# Patient Record
Sex: FEMALE | Race: WHITE | Employment: OTHER | URBAN - METROPOLITAN AREA
[De-identification: names, ages, dates, MRNs, and addresses within clinical notes are randomized per-mention and may not be internally consistent; named-entity substitution may affect disease eponyms.]

---

## 2023-02-21 LAB — AMMONIA (UMOL/L) IN PLASMA: 49 UMOL/L

## 2023-03-14 ENCOUNTER — NURSING HOME VISIT (OUTPATIENT)
Dept: POST ACUTE CARE | Facility: EXTERNAL LOCATION | Age: 68
End: 2023-03-14
Payer: MEDICARE

## 2023-03-14 DIAGNOSIS — E03.9 HYPOTHYROIDISM, UNSPECIFIED TYPE: ICD-10-CM

## 2023-03-14 DIAGNOSIS — F29 ATYPICAL PSYCHOSIS (MULTI): Primary | ICD-10-CM

## 2023-03-14 DIAGNOSIS — E46 PROTEIN-CALORIE MALNUTRITION, UNSPECIFIED SEVERITY (MULTI): ICD-10-CM

## 2023-03-14 DIAGNOSIS — F31.9 BIPOLAR AFFECTIVE DISORDER, REMISSION STATUS UNSPECIFIED (MULTI): ICD-10-CM

## 2023-03-14 DIAGNOSIS — F25.9 SCHIZOAFFECTIVE DISORDER, UNSPECIFIED TYPE (MULTI): ICD-10-CM

## 2023-03-14 PROCEDURE — 99308 SBSQ NF CARE LOW MDM 20: CPT | Performed by: EMERGENCY MEDICINE

## 2023-03-14 NOTE — LETTER
Patient: Daysi John  : 1955    Encounter Date: 2023    Daysi oJhn   67 y.o.  female  @location@        Not on File    Assessment and Plan:    Source of history: Nurse, Medical personnel, Medical record, Patient.  History limitation: None.    HPI:    No current outpatient medications on file.     No current facility-administered medications for this visit.             Physical Exam:  Vital signs as per nursing/MA documentation  General appearance: Alert and in no acute distress  HEENT: Normal Inspection  Neck - Normal Inspection  Respiratory : No respiratory distress. Lungs are clear   Cardiovascular: heart rate normal. No gallop  Back - normal inspection  Skin inspection:Warm  Musculoskeletal : No deformities  Neuro : Limited exam. Baseline    ROS: Review of symptoms is negative except for what is mentioned in HPI      No family history on file.    Social History     Socioeconomic History   • Marital status:      Spouse name: Not on file   • Number of children: Not on file   • Years of education: Not on file   • Highest education level: Not on file   Occupational History   • Not on file   Tobacco Use   • Smoking status: Not on file   • Smokeless tobacco: Not on file   Vaping Use   • Vaping status: Not on file   Substance and Sexual Activity   • Alcohol use: Not on file   • Drug use: Not on file   • Sexual activity: Not on file   Other Topics Concern   • Not on file   Social History Narrative   • Not on file     Social Determinants of Health     Financial Resource Strain: Not on file   Food Insecurity: Not on file   Transportation Needs: Not on file   Physical Activity: Not on file   Stress: Not on file   Social Connections: Not on file   Intimate Partner Violence: Not on file   Housing Stability: Not on file       Past Medical History:   Diagnosis Date   • Major depressive disorder, recurrent, unspecified (CMS/HCC)     Recurrent major depressive disorder, remission status unspecified   •  Personal history of other endocrine, nutritional and metabolic disease     History of vitamin D deficiency   • Personal history of other endocrine, nutritional and metabolic disease 02/26/2020    History of hypothyroidism   • Personal history of other mental and behavioral disorders     History of schizoaffective disorder   • Personal history of other mental and behavioral disorders     History of anxiety   • Polyp of vagina     Polyp of vagina   • Schizoaffective disorder, bipolar type (CMS/HCC)     Schizoaffective disorder, bipolar type without good prognostic features       No past surgical history on file.      Charting was completed using voice recognition technology and may include unintended errors.    Discussed with patient/family, RN, and NP.     Provider Impression     67/F     Zamzameim     1. Abdominal pain -chronic  2. Chronic constipation. Continue with a bowel regimen. The patient does have bowel movements every day  3.Severe anxiety and depression managed by psych consultants. Patient is on number of psychotropic agents. Please see HPI for medication list  4. 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 Contributing to her constipation issues  5. Nutritional status is adequate with no concerns. Patient remains obese.  6. Progressive supranuclear ophthalmoplegia. Patient on valproic acid.  7. Skin remains intact without breakdown or decubiti  8. Hypothyroidism currently supplemented     This is a woman who has multiple medical problems including rather severe mental illness. This has resulted in significant self-care deficits. She requires assistance in all activities of daily living. We will work-up this abdominal pain and her complaints. Blood work and ultrasound imaging has been ordered. We will make further recommendations following review of these results.     Provide safe environment for the patient     Continue current medication regimen     OT PT and speech therapy     Bowel and bladder,skin  care     Nutritional support      monitor and treat blood glucose     GI and DVT prophylaxis     PRN medications     Periodic lab work     Regular Follow up      CODE STATUS needs to be addressed and/or verified     Charting was completed using electronic voice recognition technology and may have unintended errors which may not have been completely corrected.        History of Present Illness  ALT-unit #6     Progress note  DNR CCA        This is a 67-year-old long-term resident.      chronic abd pain     Patient suffers from schizoaffective disorder, major depression, bipolar disorder, anxiety disorder, paranoia with psychosis. She has had ECT in the past.        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, generalized weakness, impaired mobility, thrombocytopenia, vitamin D deficiency, hypothyroidism     MEDICATION  Facility protocol meds as needed, vitamin B12 1000 mcg daily, nystatin ointment, omeprazole 20 mg daily, vitamin D3 1000 units daily, clonazepam 0.5 mg 3 tablets twice daily, Synthroid 50 mcg daily, Zofran 4 mg every 6 hours as needed, trazodone 100 mg at bedtime, Sinemet  mg 3 times daily, Biotene dry mouth moisturizer, Lipitor 20 mg daily, folic acid 1 mg daily, Effexor  mg daily, valproic acid 250 mg twice daily, ax, artificial tears, Pyridium 200 mg every 12 hours as needed, Estrace 0.1 mg/g insert 0.1 g vaginally at bedtime for 14 days, Abilify 15 mg daily, Celexa 10 mg daily     SOCIAL/FAMILY HISTORY  Reviewed as previously documented      Review of Systems  All system review as allowed by patient condition and nursing input is negative        Active Problems  Problems    · Dementia with parkinsonism (331.82,294.10) (G20,F02.80)   · Depression with anxiety (300.4)  (F41.8)   · Family history of breast cancer (V16.3) (Z80.3)   · Fibrocystic breast (610.1) (N60.19)   · Frontal lobe deficit (799.55) (R41.844)   · Memory loss (780.93) (R41.3)   · Neurocognitive deficits (781.99) (R29.818,R41.89)   · Parkinsonism (332.0) (G20)   · Progressive supranuclear palsy (333.0) (G23.1)   · Schizoaffective disorder, mixed type (295.70) (F25.0)     Past Medical History  Problems    · History of anxiety (V11.8) (Z86.59)   · History of hypothyroidism (V12.29) (Z86.39)   · History of schizoaffective disorder (V11.0) (Z86.59)   · History of vitamin D deficiency (V12.1) (Z86.39)   · History of Polyp of vagina (623.7) (N84.2)   · History of Recurrent major depressive disorder, remission status unspecified (296.30)  (F33.9)   · History of Schizoaffective disorder, bipolar type without good prognostic features (295.70)  (F25.0)     Family History  Mother    · Family history of depression (V17.0) (Z81.8)  Father    · Family history of depression (V17.0) (Z81.8)  Sister    · Family history of breast cancer (V16.3) (Z80.3)  Brother    · Family history of mental disorder (V17.0) (Z81.8)  Maternal Aunt    · Family history of breast cancer (V16.3) (Z80.3)     Social History  Problems    · Born in [place]   · Completed college, bachelors degree   · General Sciences.   · Completed elementary school   · Completed graduate school, masters degree   · Criminal Justice Degree   · Completed high school   · Former smoker (V15.82) (Z87.891)   · Has no children   · Retired from employment   · Was a [employer]  from 1992 to 2009. Before that was [employer]      .   ·  (V61.07) (Z63.4)   ·  from 1989 to 2017 when her  passed away.     Allergies  Medication    · Penicillins   Recorded By: Jessica Felix; 1/16/2014 11:48:33 AM   · Tetracyclines   Recorded By: Jessica Felix; 1/16/2014 11:48:33 AM     Vitals  /78, T 97.9, HR 74, .4, RR 18, O2 sat 97%       Physical Exam     Vital signs as per nursing/MA documentation  General appearance: Alert and in no acute distress  HEENT: Normal Inspection  Neck - Normal Inspectiopn  Respiratory : No respiratory distress. Lungs are clear   Cardiovascular: heart rate normal. No gallop  Back - normal inspection  Skin inspection:Warm  Musculoskeletal : No deformities  Neuro : Limited exam. Baseline  Psychiatric : Cooperative         Electronically Signed By: Tereso Acharya MD   4/6/23  5:57 PM

## 2023-04-06 PROBLEM — E46 PROTEIN-CALORIE MALNUTRITION (MULTI): Status: ACTIVE | Noted: 2023-04-06

## 2023-04-06 PROBLEM — F31.9 BIPOLAR DISORDER (MULTI): Status: ACTIVE | Noted: 2023-04-06

## 2023-04-06 PROBLEM — F25.9 SCHIZOAFFECTIVE DISORDER (MULTI): Status: ACTIVE | Noted: 2023-04-06

## 2023-04-06 PROBLEM — E03.9 HYPOTHYROIDISM: Status: ACTIVE | Noted: 2023-04-06

## 2023-04-06 PROBLEM — F29 ATYPICAL PSYCHOSIS (MULTI): Status: ACTIVE | Noted: 2023-04-06

## 2023-04-06 NOTE — PROGRESS NOTES
Daysi John   67 y.o.  female  @location@        Not on File    Assessment and Plan:    Source of history: Nurse, Medical personnel, Medical record, Patient.  History limitation: None.    HPI:    No current outpatient medications on file.     No current facility-administered medications for this visit.             Physical Exam:  Vital signs as per nursing/MA documentation  General appearance: Alert and in no acute distress  HEENT: Normal Inspection  Neck - Normal Inspection  Respiratory : No respiratory distress. Lungs are clear   Cardiovascular: heart rate normal. No gallop  Back - normal inspection  Skin inspection:Warm  Musculoskeletal : No deformities  Neuro : Limited exam. Baseline    ROS: Review of symptoms is negative except for what is mentioned in HPI      No family history on file.    Social History     Socioeconomic History    Marital status:      Spouse name: Not on file    Number of children: Not on file    Years of education: Not on file    Highest education level: Not on file   Occupational History    Not on file   Tobacco Use    Smoking status: Not on file    Smokeless tobacco: Not on file   Vaping Use    Vaping status: Not on file   Substance and Sexual Activity    Alcohol use: Not on file    Drug use: Not on file    Sexual activity: Not on file   Other Topics Concern    Not on file   Social History Narrative    Not on file     Social Determinants of Health     Financial Resource Strain: Not on file   Food Insecurity: Not on file   Transportation Needs: Not on file   Physical Activity: Not on file   Stress: Not on file   Social Connections: Not on file   Intimate Partner Violence: Not on file   Housing Stability: Not on file       Past Medical History:   Diagnosis Date    Major depressive disorder, recurrent, unspecified (CMS/HCC)     Recurrent major depressive disorder, remission status unspecified    Personal history of other endocrine, nutritional and metabolic disease     History of  vitamin D deficiency    Personal history of other endocrine, nutritional and metabolic disease 02/26/2020    History of hypothyroidism    Personal history of other mental and behavioral disorders     History of schizoaffective disorder    Personal history of other mental and behavioral disorders     History of anxiety    Polyp of vagina     Polyp of vagina    Schizoaffective disorder, bipolar type (CMS/HCC)     Schizoaffective disorder, bipolar type without good prognostic features       No past surgical history on file.      Charting was completed using voice recognition technology and may include unintended errors.    Discussed with patient/family, RN, and NP.

## 2023-04-06 NOTE — PROGRESS NOTES
Provider Impression     67/F     Jonah     1. Abdominal pain -chronic  2. Chronic constipation. Continue with a bowel regimen. The patient does have bowel movements every day  3.Severe anxiety and depression managed by psych consultants. Patient is on number of psychotropic agents. Please see HPI for medication list  4. 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 Contributing to her constipation issues  5. Nutritional status is adequate with no concerns. Patient remains obese.  6. Progressive supranuclear ophthalmoplegia. Patient on valproic acid.  7. Skin remains intact without breakdown or decubiti  8. Hypothyroidism currently supplemented     This is a woman who has multiple medical problems including rather severe mental illness. This has resulted in significant self-care deficits. She requires assistance in all activities of daily living. We will work-up this abdominal pain and her complaints. Blood work and ultrasound imaging has been ordered. We will make further recommendations following review of these results.     Provide safe environment for the patient     Continue current medication regimen     OT PT and speech therapy     Bowel and bladder,skin care     Nutritional support      monitor and treat blood glucose     GI and DVT prophylaxis     PRN medications     Periodic lab work     Regular Follow up      CODE STATUS needs to be addressed and/or verified     Charting was completed using electronic voice recognition technology and may have unintended errors which may not have been completely corrected.        History of Present Illness  ALT-unit #6     Progress note  DNR [facility]        This is a 67-year-old long-term resident.      chronic abd pain     Patient suffers from schizoaffective disorder, major depression, bipolar disorder, anxiety disorder, paranoia with psychosis. She has had ECT in the past.        PMH  Schizoaffective disorder, major depression, bipolar disorder, anxiety disorder, paranoia  with psychosis having ECT therapy remotely, progressive supranuclear ophthalmoplegia, frequent falls, protein calorie malnutrition, self-care deficits, mixed a phase he, generalized weakness, impaired mobility, thrombocytopenia, vitamin D deficiency, hypothyroidism     MEDICATION  Facility protocol meds as needed, vitamin B12 1000 mcg daily, nystatin ointment, omeprazole 20 mg daily, vitamin D3 1000 units daily, clonazepam 0.5 mg 3 tablets twice daily, Synthroid 50 mcg daily, Zofran 4 mg every 6 hours as needed, trazodone 100 mg at bedtime, Sinemet  mg 3 times daily, Biotene dry mouth moisturizer, Lipitor 20 mg daily, folic acid 1 mg daily, Effexor  mg daily, valproic acid 250 mg twice daily, ax, artificial tears, Pyridium 200 mg every 12 hours as needed, Estrace 0.1 mg/g insert 0.1 g vaginally at bedtime for 14 days, Abilify 15 mg daily, Celexa 10 mg daily     SOCIAL/FAMILY HISTORY  Reviewed as previously documented      Review of Systems  All system review as allowed by patient condition and nursing input is negative        Active Problems  Problems    · Dementia with parkinsonism (331.82,294.10) (G20,F02.80)   · Depression with anxiety (300.4) (F41.8)   · Family history of breast cancer (V16.3) (Z80.3)   · Fibrocystic breast (610.1) (N60.19)   · Frontal lobe deficit (799.55) (R41.844)   · Memory loss (780.93) (R41.3)   · Neurocognitive deficits (781.99) (R29.818,R41.89)   · Parkinsonism (332.0) (G20)   · Progressive supranuclear palsy (333.0) (G23.1)   · Schizoaffective disorder, mixed type (295.70) (F25.0)     Past Medical History  Problems    · History of anxiety (V11.8) (Z86.59)   · History of hypothyroidism (V12.29) (Z86.39)   · History of schizoaffective disorder (V11.0) (Z86.59)   · History of vitamin D deficiency (V12.1) (Z86.39)   · History of Polyp of vagina (623.7) (N84.2)   · History of Recurrent major depressive disorder, remission status unspecified (296.30)  (F33.9)   · History of  Schizoaffective disorder, bipolar type without good prognostic features (295.70)  (F25.0)     Family History  Mother    · Family history of depression (V17.0) (Z81.8)  Father    · Family history of depression (V17.0) (Z81.8)  Sister    · Family history of breast cancer (V16.3) (Z80.3)  Brother    · Family history of mental disorder (V17.0) (Z81.8)  Maternal Aunt    · Family history of breast cancer (V16.3) (Z80.3)     Social History  Problems    · Born in [place]   · Completed college, bachelors degree   · General Sciences.   · Completed elementary school   · Completed graduate school, masters degree   · Criminal Justice Degree   · Completed high school   · Former smoker (V15.82) (Z87.891)   · Has no children   · Retired from employment   · Was a [employer]  from 1992 to 2009. Before that was [employer]      .   ·  (V61.07) (Z63.4)   ·  from 1989 to 2017 when her  passed away.     Allergies  Medication    · Penicillins   Recorded By: Jessica Felix; 1/16/2014 11:48:33 AM   · Tetracyclines   Recorded By: Jessica Felix; 1/16/2014 11:48:33 AM     Vitals  /78, T 97.9, HR 74, .4, RR 18, O2 sat 97%      Physical Exam     Vital signs as per nursing/MA documentation  General appearance: Alert and in no acute distress  HEENT: Normal Inspection  Neck - Normal Inspectiopn  Respiratory : No respiratory distress. Lungs are clear   Cardiovascular: heart rate normal. No gallop  Back - normal inspection  Skin inspection:Warm  Musculoskeletal : No deformities  Neuro : Limited exam. Baseline  Psychiatric : Cooperative

## 2023-04-11 ENCOUNTER — NURSING HOME VISIT (OUTPATIENT)
Dept: POST ACUTE CARE | Facility: EXTERNAL LOCATION | Age: 68
End: 2023-04-11
Payer: MEDICARE

## 2023-04-11 DIAGNOSIS — E46 PROTEIN-CALORIE MALNUTRITION, UNSPECIFIED SEVERITY (MULTI): ICD-10-CM

## 2023-04-11 DIAGNOSIS — F29 ATYPICAL PSYCHOSIS (MULTI): ICD-10-CM

## 2023-04-11 DIAGNOSIS — E03.9 HYPOTHYROIDISM, UNSPECIFIED TYPE: ICD-10-CM

## 2023-04-11 DIAGNOSIS — F31.9 BIPOLAR AFFECTIVE DISORDER, REMISSION STATUS UNSPECIFIED (MULTI): Primary | ICD-10-CM

## 2023-04-11 DIAGNOSIS — F25.0 SCHIZOAFFECTIVE DISORDER, BIPOLAR TYPE (MULTI): ICD-10-CM

## 2023-04-11 PROCEDURE — 99309 SBSQ NF CARE MODERATE MDM 30: CPT | Performed by: EMERGENCY MEDICINE

## 2023-04-11 NOTE — LETTER
Patient: Daysi John  : 1955    Encounter Date: 2023     Provider Impression     67/F     Jonah     1. Abdominal pain -chronic  2. Chronic constipation. Continue with a bowel regimen. The patient does have bowel movements every day  3.Severe anxiety and depression managed by psych consultants. Patient is on number of psychotropic agents. Please see HPI for medication list  4. 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 Contributing to her constipation issues  5. Nutritional status is adequate with no concerns. Patient remains obese.  6. Progressive supranuclear ophthalmoplegia. Patient on valproic acid.  7. Skin remains intact without breakdown or decubiti  8. Hypothyroidism currently supplemented     This is a woman who has multiple medical problems including rather severe mental illness. This has resulted in significant self-care deficits. She requires assistance in all activities of daily living. We will work-up this abdominal pain and her complaints. Blood work and ultrasound imaging has been ordered. We will make further recommendations following review of these results.     -Fall prevention    -Cognitive engagement     -Monitor and treat blood pressure     -Aggressive decubitus ulcer prevention.     -Bowel and bladder care     -Optimal nutrition and supplementation as needed     -GI  and DVT prophylaxis     -Symptom control     -Ambulation as tolerated     -Will follow    CODE STATUS needs to be addressed and/or verified     Charting was completed using electronic voice recognition technology and may have unintended errors which may not have been completely corrected.        History of Present Illness  ALT-unit #6     Progress note  DNR CCA        This is a 67-year-old long-term resident.      chronic abd pain     Patient suffers from schizoaffective disorder, major depression, bipolar disorder, anxiety disorder, paranoia with psychosis. She has had ECT in the past.         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, generalized weakness, impaired mobility, thrombocytopenia, vitamin D deficiency, hypothyroidism     MEDICATION  Facility protocol meds as needed, vitamin B12 1000 mcg daily, nystatin ointment, omeprazole 20 mg daily, vitamin D3 1000 units daily, clonazepam 0.5 mg 3 tablets twice daily, Synthroid 50 mcg daily, Zofran 4 mg every 6 hours as needed, trazodone 100 mg at bedtime, Sinemet  mg 3 times daily, Biotene dry mouth moisturizer, Lipitor 20 mg daily, folic acid 1 mg daily, Effexor  mg daily, valproic acid 250 mg twice daily, ax, artificial tears, Pyridium 200 mg every 12 hours as needed, Estrace 0.1 mg/g insert 0.1 g vaginally at bedtime for 14 days, Abilify 15 mg daily, Celexa 10 mg daily     SOCIAL/FAMILY HISTORY  Reviewed as previously documented      Review of Systems  All system review as allowed by patient condition and nursing input is negative        Active Problems  Problems    · Dementia with parkinsonism (331.82,294.10) (G20,F02.80)   · Depression with anxiety (300.4) (F41.8)   · Family history of breast cancer (V16.3) (Z80.3)   · Fibrocystic breast (610.1) (N60.19)   · Frontal lobe deficit (799.55) (R41.844)   · Memory loss (780.93) (R41.3)   · Neurocognitive deficits (781.99) (R29.818,R41.89)   · Parkinsonism (332.0) (G20)   · Progressive supranuclear palsy (333.0) (G23.1)   · Schizoaffective disorder, mixed type (295.70) (F25.0)     Past Medical History  Problems    · History of anxiety (V11.8) (Z86.59)   · History of hypothyroidism (V12.29) (Z86.39)   · History of schizoaffective disorder (V11.0) (Z86.59)   · History of vitamin D deficiency (V12.1) (Z86.39)   · History of Polyp of vagina (623.7) (N84.2)   · History of Recurrent  major depressive disorder, remission status unspecified (296.30)  (F33.9)   · History of Schizoaffective disorder, bipolar type without good prognostic features (295.70)  (F25.0)     Family History  Mother    · Family history of depression (V17.0) (Z81.8)  Father    · Family history of depression (V17.0) (Z81.8)  Sister    · Family history of breast cancer (V16.3) (Z80.3)  Brother    · Family history of mental disorder (V17.0) (Z81.8)  Maternal Aunt    · Family history of breast cancer (V16.3) (Z80.3)     Social History  Problems    · Born in [place]   · Completed college, bachelors degree   · General Sciences.   · Completed elementary school   · Completed graduate school, masters degree   · Criminal Justice Degree   · Completed high school   · Former smoker (V15.82) (Z87.891)   · Has no children   · Retired from employment   · Was a [employer]  from 1992 to 2009. Before that was [employer]      .   ·  (V61.07) (Z63.4)   ·  from 1989 to 2017 when her  passed away.     Allergies  Medication    · Penicillins   Recorded By: Jessica Felix; 1/16/2014 11:48:33 AM   · Tetracyclines   Recorded By: Jessica Felix; 1/16/2014 11:48:33 AM           Physical Exam     Vital signs as per nursing/MA documentation  General appearance: Alert and in no acute distress  HEENT: Normal Inspection  Neck - Normal Inspectiopn  Respiratory : No respiratory distress. Lungs are clear   Cardiovascular: heart rate normal. No gallop  Back - normal inspection  Skin inspection:Warm  Musculoskeletal : No deformities  Neuro : Limited exam. Baseline  Psychiatric : Cooperative                 Electronically Signed By: Tereso Acharya MD   4/20/23  6:03 PM

## 2023-04-20 NOTE — PROGRESS NOTES
Provider Impression     67/F     Jonah     1. Abdominal pain -chronic  2. Chronic constipation. Continue with a bowel regimen. The patient does have bowel movements every day  3.Severe anxiety and depression managed by psych consultants. Patient is on number of psychotropic agents. Please see HPI for medication list  4. 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 Contributing to her constipation issues  5. Nutritional status is adequate with no concerns. Patient remains obese.  6. Progressive supranuclear ophthalmoplegia. Patient on valproic acid.  7. Skin remains intact without breakdown or decubiti  8. Hypothyroidism currently supplemented     This is a woman who has multiple medical problems including rather severe mental illness. This has resulted in significant self-care deficits. She requires assistance in all activities of daily living. We will work-up this abdominal pain and her complaints. Blood work and ultrasound imaging has been ordered. We will make further recommendations following review of these results.     -Fall prevention    -Cognitive engagement     -Monitor and treat blood pressure     -Aggressive decubitus ulcer prevention.     -Bowel and bladder care     -Optimal nutrition and supplementation as needed     -GI  and DVT prophylaxis     -Symptom control     -Ambulation as tolerated     -Will follow    CODE STATUS needs to be addressed and/or verified     Charting was completed using electronic voice recognition technology and may have unintended errors which may not have been completely corrected.        History of Present Illness  ALT-unit #6     Progress note  DNR CCA        This is a 67-year-old long-term resident.      chronic abd pain     Patient suffers from schizoaffective disorder, major depression, bipolar disorder, anxiety disorder, paranoia with psychosis. She has had ECT in the past.        [facility]  Schizoaffective disorder, major depression, bipolar disorder, anxiety disorder,  paranoia with psychosis having ECT therapy remotely, progressive supranuclear ophthalmoplegia, frequent falls, protein calorie malnutrition, self-care deficits, mixed a phase he, generalized weakness, impaired mobility, thrombocytopenia, vitamin D deficiency, hypothyroidism     MEDICATION  Facility protocol meds as needed, vitamin B12 1000 mcg daily, nystatin ointment, omeprazole 20 mg daily, vitamin D3 1000 units daily, clonazepam 0.5 mg 3 tablets twice daily, Synthroid 50 mcg daily, Zofran 4 mg every 6 hours as needed, trazodone 100 mg at bedtime, Sinemet  mg 3 times daily, Biotene dry mouth moisturizer, Lipitor 20 mg daily, folic acid 1 mg daily, Effexor  mg daily, valproic acid 250 mg twice daily, ax, artificial tears, Pyridium 200 mg every 12 hours as needed, Estrace 0.1 mg/g insert 0.1 g vaginally at bedtime for 14 days, Abilify 15 mg daily, Celexa 10 mg daily     SOCIAL/FAMILY HISTORY  Reviewed as previously documented      Review of Systems  All system review as allowed by patient condition and nursing input is negative        Active Problems  Problems    · Dementia with parkinsonism (331.82,294.10) (G20,F02.80)   · Depression with anxiety (300.4) (F41.8)   · Family history of breast cancer (V16.3) (Z80.3)   · Fibrocystic breast (610.1) (N60.19)   · Frontal lobe deficit (799.55) (R41.844)   · Memory loss (780.93) (R41.3)   · Neurocognitive deficits (781.99) (R29.818,R41.89)   · Parkinsonism (332.0) (G20)   · Progressive supranuclear palsy (333.0) (G23.1)   · Schizoaffective disorder, mixed type (295.70) (F25.0)     Past Medical History  Problems    · History of anxiety (V11.8) (Z86.59)   · History of hypothyroidism (V12.29) (Z86.39)   · History of schizoaffective disorder (V11.0) (Z86.59)   · History of vitamin D deficiency (V12.1) (Z86.39)   · History of Polyp of vagina (623.7) (N84.2)   · History of Recurrent major depressive disorder, remission status unspecified (296.30)  (F33.9)   · History  of Schizoaffective disorder, bipolar type without good prognostic features (295.70)  (F25.0)     Family History  Mother    · Family history of depression (V17.0) (Z81.8)  Father    · Family history of depression (V17.0) (Z81.8)  Sister    · Family history of breast cancer (V16.3) (Z80.3)  Brother    · Family history of mental disorder (V17.0) (Z81.8)  Maternal Aunt    · Family history of breast cancer (V16.3) (Z80.3)     Social History  Problems    · Born in [place]   · Completed college, bachelors degree   · General Sciences.   · Completed elementary school   · Completed graduate school, masters degree   · Criminal Justice Degree   · Completed high school   · Former smoker (V15.82) (Z87.891)   · Has no children   · Retired from employment   · Was a [employer] [place]  from 1992 to 2009. Before that was [employer]      .   ·  (V61.07) (Z63.4)   ·  from 1989 to 2017 when her  passed away.     Allergies  Medication    · Penicillins   Recorded By: Jessica Felix; 1/16/2014 11:48:33 AM   · Tetracyclines   Recorded By: Jessica Felix; 1/16/2014 11:48:33 AM           Physical Exam     Vital signs as per nursing/MA documentation  General appearance: Alert and in no acute distress  HEENT: Normal Inspection  Neck - Normal Inspectiopn  Respiratory : No respiratory distress. Lungs are clear   Cardiovascular: heart rate normal. No gallop  Back - normal inspection  Skin inspection:Warm  Musculoskeletal : No deformities  Neuro : Limited exam. Baseline  Psychiatric : Cooperative

## 2023-05-05 ENCOUNTER — NURSING HOME VISIT (OUTPATIENT)
Dept: POST ACUTE CARE | Facility: EXTERNAL LOCATION | Age: 68
End: 2023-05-05
Payer: MEDICARE

## 2023-05-05 DIAGNOSIS — E46 PROTEIN-CALORIE MALNUTRITION, UNSPECIFIED SEVERITY (MULTI): ICD-10-CM

## 2023-05-05 DIAGNOSIS — F25.0 SCHIZOAFFECTIVE DISORDER, BIPOLAR TYPE (MULTI): ICD-10-CM

## 2023-05-05 DIAGNOSIS — F29 ATYPICAL PSYCHOSIS (MULTI): Primary | ICD-10-CM

## 2023-05-05 DIAGNOSIS — F31.9 BIPOLAR AFFECTIVE DISORDER, REMISSION STATUS UNSPECIFIED (MULTI): ICD-10-CM

## 2023-05-05 PROCEDURE — 99309 SBSQ NF CARE MODERATE MDM 30: CPT | Performed by: EMERGENCY MEDICINE

## 2023-05-05 NOTE — LETTER
Patient: Daysi John  : 1955    Encounter Date: 2023     Provider Impression     67/F     Jonah     1. Abdominal pain -chronic  2. Chronic constipation. Continue with a bowel regimen. The patient does have bowel movements every day  3.Severe anxiety and depression managed by psych consultants. Patient is on number of psychotropic agents. Please see HPI for medication list  4. 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 Contributing to her constipation issues  5. Nutritional status is adequate with no concerns. Patient remains obese.  6. Progressive supranuclear ophthalmoplegia. Patient on valproic acid.  7. Skin remains intact without breakdown or decubiti  8. Hypothyroidism currently supplemented     This is a woman who has multiple medical problems including rather severe mental illness. This has resulted in significant self-care deficits. She requires assistance in all activities of daily living. We will work-up this abdominal pain and her complaints. Blood work and ultrasound imaging has been ordered. We will make further recommendations following review of these results.     Provide safe environment for the patient    Continue current medication regimen    Fall prevention    OT PT and speech therapy    Monitor and treat blood pressure    Skin care and aggressive decubitus ulcer prevention.    Bowel and bladder care    Optimal nutrition and supplementation    Monitor and treat blood glucose    GI  and DVT prophylaxis    Symptom control with as needed medications    Periodic lab work    Will follow    Charting is done using voice recognition software and may contain errors which have not been completely corrected       Charting was completed using electronic voice recognition technology and may have unintended errors which may not have been completely corrected.        History of Present Illness  ALT-unit #6     Progress note  DNR CCA        This is a 67-year-old long-term resident.       chronic abd pain     Patient suffers from schizoaffective disorder, major depression, bipolar disorder, anxiety disorder, paranoia with psychosis. She has had ECT in the past.        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, generalized weakness, impaired mobility, thrombocytopenia, vitamin D deficiency, hypothyroidism     MEDICATION  Facility protocol meds as needed, vitamin B12 1000 mcg daily, nystatin ointment, omeprazole 20 mg daily, vitamin D3 1000 units daily, clonazepam 0.5 mg 3 tablets twice daily, Synthroid 50 mcg daily, Zofran 4 mg every 6 hours as needed, trazodone 100 mg at bedtime, Sinemet  mg 3 times daily, Biotene dry mouth moisturizer, Lipitor 20 mg daily, folic acid 1 mg daily, Effexor  mg daily, valproic acid 250 mg twice daily, ax, artificial tears, Pyridium 200 mg every 12 hours as needed, Estrace 0.1 mg/g insert 0.1 g vaginally at bedtime for 14 days, Abilify 15 mg daily, Celexa 10 mg daily     SOCIAL/FAMILY HISTORY  Reviewed as previously documented      Review of Systems  All system review as allowed by patient condition and nursing input is negative        Active Problems  Problems    · Dementia with parkinsonism (331.82,294.10) (G20,F02.80)   · Depression with anxiety (300.4) (F41.8)   · Family history of breast cancer (V16.3) (Z80.3)   · Fibrocystic breast (610.1) (N60.19)   · Frontal lobe deficit (799.55) (R41.844)   · Memory loss (780.93) (R41.3)   · Neurocognitive deficits (781.99) (R29.818,R41.89)   · Parkinsonism (332.0) (G20)   · Progressive supranuclear palsy (333.0) (G23.1)   · Schizoaffective disorder, mixed type (295.70) (F25.0)     Past Medical History  Problems    · History of anxiety (V11.8) (Z86.59)   · History of hypothyroidism (V12.29)  (Z86.39)   · History of schizoaffective disorder (V11.0) (Z86.59)   · History of vitamin D deficiency (V12.1) (Z86.39)   · History of Polyp of vagina (623.7) (N84.2)   · History of Recurrent major depressive disorder, remission status unspecified (296.30)  (F33.9)   · History of Schizoaffective disorder, bipolar type without good prognostic features (295.70)  (F25.0)     Family History  Mother    · Family history of depression (V17.0) (Z81.8)  Father    · Family history of depression (V17.0) (Z81.8)  Sister    · Family history of breast cancer (V16.3) (Z80.3)  Brother    · Family history of mental disorder (V17.0) (Z81.8)  Maternal Aunt    · Family history of breast cancer (V16.3) (Z80.3)     Social History  Problems    · Born in [place]   · Completed college, bachelors degree   · General Sciences.   · Completed elementary school   · Completed graduate school, masters degree   · Criminal Justice Degree   · Completed high school   · Former smoker (V15.82) (Z87.891)   · Has no children   · Retired from employment   · Was a [employer]  from 1992 to 2009. Before that was [employer]      .   ·  (V61.07) (Z63.4)   ·  from 1989 to 2017 when her  passed away.     Allergies  Medication    · Penicillins   Recorded By: Jessica Felix; 1/16/2014 11:48:33 AM   · Tetracyclines   Recorded By: Jessica Felix; 1/16/2014 11:48:33 AM           Physical Exam     Vital signs as per nursing/MA documentation  General appearance: Alert and in no acute distress  HEENT: Normal Inspection  Neck - Normal Inspectiopn  Respiratory : No respiratory distress. Lungs are clear   Cardiovascular: heart rate normal. No gallop  Back - normal inspection  Skin inspection:Warm  Musculoskeletal : No deformities  Neuro : Limited exam. Baseline  Psychiatric : Cooperative                 Electronically Signed By: Tereso Acharya MD   5/11/23  3:29 PM

## 2023-05-11 NOTE — PROGRESS NOTES
Provider Impression     67/F     Jonah     1. Abdominal pain -chronic  2. Chronic constipation. Continue with a bowel regimen. The patient does have bowel movements every day  3.Severe anxiety and depression managed by psych consultants. Patient is on number of psychotropic agents. Please see HPI for medication list  4. 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 Contributing to her constipation issues  5. Nutritional status is adequate with no concerns. Patient remains obese.  6. Progressive supranuclear ophthalmoplegia. Patient on valproic acid.  7. Skin remains intact without breakdown or decubiti  8. Hypothyroidism currently supplemented     This is a woman who has multiple medical problems including rather severe mental illness. This has resulted in significant self-care deficits. She requires assistance in all activities of daily living. We will work-up this abdominal pain and her complaints. Blood work and ultrasound imaging has been ordered. We will make further recommendations following review of these results.     Provide safe environment for the patient    Continue current medication regimen    Fall prevention    OT PT and speech therapy    Monitor and treat blood pressure    Skin care and aggressive decubitus ulcer prevention.    Bowel and bladder care    Optimal nutrition and supplementation    Monitor and treat blood glucose    GI  and DVT prophylaxis    Symptom control with as needed medications    Periodic lab work    Will follow    Charting is done using voice recognition software and may contain errors which have not been completely corrected       Charting was completed using electronic voice recognition technology and may have unintended errors which may not have been completely corrected.        History of Present Illness  ALT-unit #6     Progress note  DNR CCA        This is a 67-year-old long-term resident.      chronic abd pain     Patient suffers from schizoaffective disorder, major  depression, bipolar disorder, anxiety disorder, paranoia with psychosis. She has had ECT in the past.        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, generalized weakness, impaired mobility, thrombocytopenia, vitamin D deficiency, hypothyroidism     MEDICATION  Facility protocol meds as needed, vitamin B12 1000 mcg daily, nystatin ointment, omeprazole 20 mg daily, vitamin D3 1000 units daily, clonazepam 0.5 mg 3 tablets twice daily, Synthroid 50 mcg daily, Zofran 4 mg every 6 hours as needed, trazodone 100 mg at bedtime, Sinemet  mg 3 times daily, Biotene dry mouth moisturizer, Lipitor 20 mg daily, folic acid 1 mg daily, Effexor  mg daily, valproic acid 250 mg twice daily, ax, artificial tears, Pyridium 200 mg every 12 hours as needed, Estrace 0.1 mg/g insert 0.1 g vaginally at bedtime for 14 days, Abilify 15 mg daily, Celexa 10 mg daily     SOCIAL/FAMILY HISTORY  Reviewed as previously documented      Review of Systems  All system review as allowed by patient condition and nursing input is negative        Active Problems  Problems    · Dementia with parkinsonism (331.82,294.10) (G20,F02.80)   · Depression with anxiety (300.4) (F41.8)   · Family history of breast cancer (V16.3) (Z80.3)   · Fibrocystic breast (610.1) (N60.19)   · Frontal lobe deficit (799.55) (R41.844)   · Memory loss (780.93) (R41.3)   · Neurocognitive deficits (781.99) (R29.818,R41.89)   · Parkinsonism (332.0) (G20)   · Progressive supranuclear palsy (333.0) (G23.1)   · Schizoaffective disorder, mixed type (295.70) (F25.0)     Past Medical History  Problems    · History of anxiety (V11.8) (Z86.59)   · History of hypothyroidism (V12.29) (Z86.39)   · History of schizoaffective disorder (V11.0) (Z86.59)   · History of  vitamin D deficiency (V12.1) (Z86.39)   · History of Polyp of vagina (623.7) (N84.2)   · History of Recurrent major depressive disorder, remission status unspecified (296.30)  (F33.9)   · History of Schizoaffective disorder, bipolar type without good prognostic features (295.70)  (F25.0)     Family History  Mother    · Family history of depression (V17.0) (Z81.8)  Father    · Family history of depression (V17.0) (Z81.8)  Sister    · Family history of breast cancer (V16.3) (Z80.3)  Brother    · Family history of mental disorder (V17.0) (Z81.8)  Maternal Aunt    · Family history of breast cancer (V16.3) (Z80.3)     Social History  Problems    · Born in [place]   · Completed college, bachelors degree   · General Sciences.   · Completed elementary school   · Completed graduate school, masters degree   · Criminal Justice Degree   · Completed high school   · Former smoker (V15.82) (Z87.891)   · Has no children   · Retired from employment   · Was a [employer]  from 1992 to 2009. Before that was [employer]      .   ·  (V61.07) (Z63.4)   ·  from 1989 to 2017 when her  passed away.     Allergies  Medication    · Penicillins   Recorded By: Jessica Felix; 1/16/2014 11:48:33 AM   · Tetracyclines   Recorded By: Jessica Felix; 1/16/2014 11:48:33 AM           Physical Exam     Vital signs as per nursing/MA documentation  General appearance: Alert and in no acute distress  HEENT: Normal Inspection  Neck - Normal Inspectiopn  Respiratory : No respiratory distress. Lungs are clear   Cardiovascular: heart rate normal. No gallop  Back - normal inspection  Skin inspection:Warm  Musculoskeletal : No deformities  Neuro : Limited exam. Baseline  Psychiatric : Cooperative

## 2023-06-08 ENCOUNTER — NURSING HOME VISIT (OUTPATIENT)
Dept: POST ACUTE CARE | Facility: EXTERNAL LOCATION | Age: 68
End: 2023-06-08
Payer: MEDICARE

## 2023-06-08 DIAGNOSIS — F25.9 SCHIZOAFFECTIVE DISORDER, UNSPECIFIED TYPE (MULTI): Primary | ICD-10-CM

## 2023-06-08 DIAGNOSIS — F29 ATYPICAL PSYCHOSIS (MULTI): ICD-10-CM

## 2023-06-08 DIAGNOSIS — E03.9 HYPOTHYROIDISM, UNSPECIFIED TYPE: ICD-10-CM

## 2023-06-08 DIAGNOSIS — E46 PROTEIN-CALORIE MALNUTRITION, UNSPECIFIED SEVERITY (MULTI): ICD-10-CM

## 2023-06-08 DIAGNOSIS — F31.9 BIPOLAR AFFECTIVE DISORDER, REMISSION STATUS UNSPECIFIED (MULTI): ICD-10-CM

## 2023-06-08 PROCEDURE — 99308 SBSQ NF CARE LOW MDM 20: CPT | Performed by: EMERGENCY MEDICINE

## 2023-06-08 NOTE — LETTER
Patient: Daysi John  : 1955    Encounter Date: 2023     Provider Impression          Jonah     1. Abdominal pain -chronic  2. Chronic constipation. Continue with a bowel regimen. The patient does have bowel movements every day  3.Severe anxiety and depression managed by psych consultants. Patient is on number of psychotropic agents. Please see HPI for medication list  4. 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 Contributing to her constipation issues  5. Nutritional status is adequate with no concerns. Patient remains obese.  6. Progressive supranuclear ophthalmoplegia. Patient on valproic acid.  7. Skin remains intact without breakdown or decubiti  8. Hypothyroidism currently supplemented     This is a woman who has multiple medical problems including rather severe mental illness. This has resulted in significant self-care deficits. She requires assistance in all activities of daily living. We will work-up this abdominal pain and her complaints. Blood work and ultrasound imaging has been ordered. We will make further recommendations following review of these results.     Rx list reviewed.   PT and OT evaluation is in the process.   Routine safety measures, fall precautions, risk modification and alarm placement if needed for prevention of falls.   Skin care precautions, prevention of pressures sores at pressure points assessed.   Pt needs to be monitored frequently by nursing staff particularly at night time.   Any confusion, agitation or behavioural disturbance needs to be attended, as per home policy   rapid covid Ag assay need to be done, notify if positive.   If needed appropriate measures to be taken for alarm placements and assisted devices, pt was told not to get up and ambulate at night unless help and assist available at bedside,   labs will be done as per our routine protocol.   PO intake need to be monitored if consuming po.       Charting is done using voice recognition software  and may contain errors which have not been completely corrected         Charting was completed using electronic voice recognition technology and may have unintended errors which may not have been completely corrected.        History of Present Illness  ALT-unit #6     Progress note  DNR CCA        This is a  long-term resident.      chronic abd pain     Patient suffers from schizoaffective disorder, major depression, bipolar disorder, anxiety disorder, paranoia with psychosis. She has had ECT in the past.        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, generalized weakness, impaired mobility, thrombocytopenia, vitamin D deficiency, hypothyroidism     MEDICATION  Facility protocol meds as needed, vitamin B12 1000 mcg daily, nystatin ointment, omeprazole 20 mg daily, vitamin D3 1000 units daily, clonazepam 0.5 mg 3 tablets twice daily, Synthroid 50 mcg daily, Zofran 4 mg every 6 hours as needed, trazodone 100 mg at bedtime, Sinemet  mg 3 times daily, Biotene dry mouth moisturizer, Lipitor 20 mg daily, folic acid 1 mg daily, Effexor  mg daily, valproic acid 250 mg twice daily, ax, artificial tears, Pyridium 200 mg every 12 hours as needed, Estrace 0.1 mg/g insert 0.1 g vaginally at bedtime for 14 days, Abilify 15 mg daily, Celexa 10 mg daily     SOCIAL/FAMILY HISTORY  Reviewed as previously documented      Review of Systems  All system review as allowed by patient condition and nursing input is negative        Active Problems  Problems    · Dementia with parkinsonism (331.82,294.10) (G20,F02.80)   · Depression with anxiety (300.4) (F41.8)   · Family history of breast cancer (V16.3) (Z80.3)   · Fibrocystic breast (610.1) (N60.19)   · Frontal lobe deficit (799.55) (R41.844)   · Memory loss  (780.93) (R41.3)   · Neurocognitive deficits (781.99) (R29.818,R41.89)   · Parkinsonism (332.0) (G20)   · Progressive supranuclear palsy (333.0) (G23.1)   · Schizoaffective disorder, mixed type (295.70) (F25.0)     Past Medical History  Problems    · History of anxiety (V11.8) (Z86.59)   · History of hypothyroidism (V12.29) (Z86.39)   · History of schizoaffective disorder (V11.0) (Z86.59)   · History of vitamin D deficiency (V12.1) (Z86.39)   · History of Polyp of vagina (623.7) (N84.2)   · History of Recurrent major depressive disorder, remission status unspecified (296.30)  (F33.9)   · History of Schizoaffective disorder, bipolar type without good prognostic features (295.70)  (F25.0)     Family History  Mother    · Family history of depression (V17.0) (Z81.8)  Father    · Family history of depression (V17.0) (Z81.8)  Sister    · Family history of breast cancer (V16.3) (Z80.3)  Brother    · Family history of mental disorder (V17.0) (Z81.8)  Maternal Aunt    · Family history of breast cancer (V16.3) (Z80.3)     Social History  Problems    · Born in [place]   · Completed college, bachelors degree   · General Sciences.   · Completed elementary school   · Completed graduate school, masters degree   · Criminal Justice Degree   · Completed high school   · Former smoker (V15.82) (Z87.891)   · Has no children   · Retired from employment   · Was a [employer]  from 1992 to 2009. Before that was [employer]      .   ·  (V61.07) (Z63.4)   ·  from 1989 to 2017 when her  passed away.     Allergies  Medication    · Penicillins   Recorded By: Jessica Felix; 1/16/2014 11:48:33 AM   · Tetracyclines   Recorded By: Jessica Felix; 1/16/2014 11:48:33 AM           Physical Exam     Vital signs as per nursing/MA documentation  General appearance: Alert and in no acute distress  HEENT: Normal Inspection  Neck - Normal Inspectiopn  Respiratory : No respiratory distress. Lungs are clear    Cardiovascular: heart rate normal. No gallop  Back - normal inspection  Skin inspection:Warm  Musculoskeletal : No deformities  Neuro : Limited exam. Baseline  Psychiatric : Cooperative                 Electronically Signed By: Tereso Acharya MD   6/18/23 11:20 AM

## 2023-06-18 NOTE — PROGRESS NOTES
Provider Impression          Jonah     1. Abdominal pain -chronic  2. Chronic constipation. Continue with a bowel regimen. The patient does have bowel movements every day  3.Severe anxiety and depression managed by psych consultants. Patient is on number of psychotropic agents. Please see HPI for medication list  4. 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 Contributing to her constipation issues  5. Nutritional status is adequate with no concerns. Patient remains obese.  6. Progressive supranuclear ophthalmoplegia. Patient on valproic acid.  7. Skin remains intact without breakdown or decubiti  8. Hypothyroidism currently supplemented     This is a woman who has multiple medical problems including rather severe mental illness. This has resulted in significant self-care deficits. She requires assistance in all activities of daily living. We will work-up this abdominal pain and her complaints. Blood work and ultrasound imaging has been ordered. We will make further recommendations following review of these results.     Rx list reviewed.   PT and OT evaluation is in the process.   Routine safety measures, fall precautions, risk modification and alarm placement if needed for prevention of falls.   Skin care precautions, prevention of pressures sores at pressure points assessed.   Pt needs to be monitored frequently by nursing staff particularly at night time.   Any confusion, agitation or behavioural disturbance needs to be attended, as per home policy   rapid covid Ag assay need to be done, notify if positive.   If needed appropriate measures to be taken for alarm placements and assisted devices, pt was told not to get up and ambulate at night unless help and assist available at bedside,   labs will be done as per our routine protocol.   PO intake need to be monitored if consuming po.       Charting is done using voice recognition software and may contain errors which have not been completely corrected          Charting was completed using electronic voice recognition technology and may have unintended errors which may not have been completely corrected.        History of Present Illness  ALT-unit #6     Progress note  DNR CCA        This is a  long-term resident.      chronic abd pain     Patient suffers from schizoaffective disorder, major depression, bipolar disorder, anxiety disorder, paranoia with psychosis. She has had ECT in the past.        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, generalized weakness, impaired mobility, thrombocytopenia, vitamin D deficiency, hypothyroidism     MEDICATION  Facility protocol meds as needed, vitamin B12 1000 mcg daily, nystatin ointment, omeprazole 20 mg daily, vitamin D3 1000 units daily, clonazepam 0.5 mg 3 tablets twice daily, Synthroid 50 mcg daily, Zofran 4 mg every 6 hours as needed, trazodone 100 mg at bedtime, Sinemet  mg 3 times daily, Biotene dry mouth moisturizer, Lipitor 20 mg daily, folic acid 1 mg daily, Effexor  mg daily, valproic acid 250 mg twice daily, ax, artificial tears, Pyridium 200 mg every 12 hours as needed, Estrace 0.1 mg/g insert 0.1 g vaginally at bedtime for 14 days, Abilify 15 mg daily, Celexa 10 mg daily     SOCIAL/FAMILY HISTORY  Reviewed as previously documented      Review of Systems  All system review as allowed by patient condition and nursing input is negative        Active Problems  Problems    · Dementia with parkinsonism (331.82,294.10) (G20,F02.80)   · Depression with anxiety (300.4) (F41.8)   · Family history of breast cancer (V16.3) (Z80.3)   · Fibrocystic breast (610.1) (N60.19)   · Frontal lobe deficit (799.55) (R41.844)   · Memory loss (780.93) (R41.3)   · Neurocognitive deficits (781.99)  (R29.818,R41.89)   · Parkinsonism (332.0) (G20)   · Progressive supranuclear palsy (333.0) (G23.1)   · Schizoaffective disorder, mixed type (295.70) (F25.0)     Past Medical History  Problems    · History of anxiety (V11.8) (Z86.59)   · History of hypothyroidism (V12.29) (Z86.39)   · History of schizoaffective disorder (V11.0) (Z86.59)   · History of vitamin D deficiency (V12.1) (Z86.39)   · History of Polyp of vagina (623.7) (N84.2)   · History of Recurrent major depressive disorder, remission status unspecified (296.30)  (F33.9)   · History of Schizoaffective disorder, bipolar type without good prognostic features (295.70)  (F25.0)     Family History  Mother    · Family history of depression (V17.0) (Z81.8)  Father    · Family history of depression (V17.0) (Z81.8)  Sister    · Family history of breast cancer (V16.3) (Z80.3)  Brother    · Family history of mental disorder (V17.0) (Z81.8)  Maternal Aunt    · Family history of breast cancer (V16.3) (Z80.3)     Social History  Problems    · Born in [place]   · Completed college, bachelors degree   · General Sciences.   · Completed elementary school   · Completed graduate school, masters degree   · Criminal Justice Degree   · Completed high school   · Former smoker (V15.82) (Z87.891)   · Has no children   · Retired from employment   · Was a [employer]  from 1992 to 2009. Before that was [employer]      .   ·  (V61.07) (Z63.4)   ·  from 1989 to 2017 when her  passed away.     Allergies  Medication    · Penicillins   Recorded By: Jessica Felix; 1/16/2014 11:48:33 AM   · Tetracyclines   Recorded By: Jessica Felix; 1/16/2014 11:48:33 AM           Physical Exam     Vital signs as per nursing/MA documentation  General appearance: Alert and in no acute distress  HEENT: Normal Inspection  Neck - Normal Inspectiopn  Respiratory : No respiratory distress. Lungs are clear   Cardiovascular: heart rate normal. No gallop  Back  - normal inspection  Skin inspection:Warm  Musculoskeletal : No deformities  Neuro : Limited exam. Baseline  Psychiatric : Cooperative

## 2023-07-11 ENCOUNTER — NURSING HOME VISIT (OUTPATIENT)
Dept: POST ACUTE CARE | Facility: EXTERNAL LOCATION | Age: 68
End: 2023-07-11
Payer: MEDICARE

## 2023-07-11 DIAGNOSIS — F25.9 SCHIZOAFFECTIVE DISORDER, UNSPECIFIED TYPE (MULTI): Primary | ICD-10-CM

## 2023-07-11 DIAGNOSIS — F31.9 BIPOLAR AFFECTIVE DISORDER, REMISSION STATUS UNSPECIFIED (MULTI): ICD-10-CM

## 2023-07-11 DIAGNOSIS — F29 ATYPICAL PSYCHOSIS (MULTI): ICD-10-CM

## 2023-07-11 DIAGNOSIS — E46 PROTEIN-CALORIE MALNUTRITION, UNSPECIFIED SEVERITY (MULTI): ICD-10-CM

## 2023-07-11 PROCEDURE — 99309 SBSQ NF CARE MODERATE MDM 30: CPT | Performed by: EMERGENCY MEDICINE

## 2023-07-11 NOTE — LETTER
Patient: Daysi John  : 1955    Encounter Date: 2023     Provider Impression          Jonah     1. Abdominal pain -chronic  2. Chronic constipation. Continue with a bowel regimen. The patient does have bowel movements every day  3.Severe anxiety and depression managed by psych consultants. Patient is on number of psychotropic agents. Please see HPI for medication list  4. 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 Contributing to her constipation issues  5. Nutritional status is adequate with no concerns. Patient remains obese.  6. Progressive supranuclear ophthalmoplegia. Patient on valproic acid.  7. Skin remains intact without breakdown or decubiti  8. Hypothyroidism currently supplemented     This is a woman who has multiple medical problems including rather severe mental illness. This has resulted in significant self-care deficits. She requires assistance in all activities of daily living. We will work-up this abdominal pain and her complaints. Blood work and ultrasound imaging has been ordered. We will make further recommendations following review of these results.     1. medications are reviewed      2. Continue with rehabilitative, supportive, and or restorative care as ordered and as the patient tolerates     3. Laboratory evaluations will be monitored on an ongoing as needed basis     4. Medications have been cross-referenced with the patient's diagnoses list, and medications reductions have been considered and/or implemented.     5. Pharmacy recommendations are addressed on an ongoing as needed basis.     6. Controlled substances have been electronically scripted every 60 days for opiates and others of similar schedule, and every 6 months for sedative/hypnotics and others of similar schedule.     7. Nursing has been queried about any potential adverse events that need to be reported to me.    Salient information and adjustment of care plan pertaining to this individual patient  interaction today are the following:      A. We will continue with restorative and supportive care as the patient tolerates    B. Laboratory examinations will continue to be drawn on an ongoing as-needed basis. The patient's weight needs to be monitored and if needed we may need to institute appetite stimulating medication    C. The patient's long term prognosis is guarded    Charting is done using voice recognition software and may contain errors which may not have been completely corrected          History of Present Illness  ALT-unit #6     Progress note  DNR CCA        This is a  long-term resident.      chronic abd pain     Patient suffers from schizoaffective disorder, major depression, bipolar disorder, anxiety disorder, paranoia with psychosis. She has had ECT in the past.        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, generalized weakness, impaired mobility, thrombocytopenia, vitamin D deficiency, hypothyroidism     MEDICATION  Facility protocol meds as needed, vitamin B12 1000 mcg daily, nystatin ointment, omeprazole 20 mg daily, vitamin D3 1000 units daily, clonazepam 0.5 mg 3 tablets twice daily, Synthroid 50 mcg daily, Zofran 4 mg every 6 hours as needed, trazodone 100 mg at bedtime, Sinemet  mg 3 times daily, Biotene dry mouth moisturizer, Lipitor 20 mg daily, folic acid 1 mg daily, Effexor  mg daily, valproic acid 250 mg twice daily, ax, artificial tears, Pyridium 200 mg every 12 hours as needed, Estrace 0.1 mg/g insert 0.1 g vaginally at bedtime for 14 days, Abilify 15 mg daily, Celexa 10 mg daily     SOCIAL/FAMILY HISTORY  Reviewed as previously documented      Review of Systems  All system review as allowed by patient condition and nursing input is negative         Active Problems  Problems    · Dementia with parkinsonism (331.82,294.10) (G20,F02.80)   · Depression with anxiety (300.4) (F41.8)   · Family history of breast cancer (V16.3) (Z80.3)   · Fibrocystic breast (610.1) (N60.19)   · Frontal lobe deficit (799.55) (R41.844)   · Memory loss (780.93) (R41.3)   · Neurocognitive deficits (781.99) (R29.818,R41.89)   · Parkinsonism (332.0) (G20)   · Progressive supranuclear palsy (333.0) (G23.1)   · Schizoaffective disorder, mixed type (295.70) (F25.0)     Past Medical History  Problems    · History of anxiety (V11.8) (Z86.59)   · History of hypothyroidism (V12.29) (Z86.39)   · History of schizoaffective disorder (V11.0) (Z86.59)   · History of vitamin D deficiency (V12.1) (Z86.39)   · History of Polyp of vagina (623.7) (N84.2)   · History of Recurrent major depressive disorder, remission status unspecified (296.30)  (F33.9)   · History of Schizoaffective disorder, bipolar type without good prognostic features (295.70)  (F25.0)     Family History  Mother    · Family history of depression (V17.0) (Z81.8)  Father    · Family history of depression (V17.0) (Z81.8)  Sister    · Family history of breast cancer (V16.3) (Z80.3)  Brother    · Family history of mental disorder (V17.0) (Z81.8)  Maternal Aunt    · Family history of breast cancer (V16.3) (Z80.3)     Social History  Problems    · Born in [place]   · Completed college, bachelors degree   · General Sciences.   · Completed elementary school   · Completed graduate school, masters degree   · Criminal Justice Degree   · Completed high school   · Former smoker (V15.82) (Z87.891)   · Has no children   · Retired from employment   · Was a [employer]  from 1992 to 2009. Before that was [employer]      .   ·  (V61.07) (Z63.4)   ·  from 1989 to 2017 when her  passed away.     Allergies  Medication    · Penicillins   Recorded By: Jessica Felix; 1/16/2014 11:48:33 AM   ·  Tetracyclines   Recorded By: Jessica Felix; 1/16/2014 11:48:33 AM           Physical Exam     Vital signs as per nursing/MA documentation  General appearance: Alert and in no acute distress  HEENT: Normal Inspection  Neck - Normal Inspectiopn  Respiratory : No respiratory distress. Lungs are clear   Cardiovascular: heart rate normal. No gallop  Back - normal inspection  Skin inspection:Warm  Musculoskeletal : No deformities  Neuro : Limited exam. Baseline  Psychiatric : Cooperative                 Electronically Signed By: Tereso Acharya MD   7/16/23  8:22 AM

## 2023-07-16 NOTE — PROGRESS NOTES
Provider Impression          Jonah     1. Abdominal pain -chronic  2. Chronic constipation. Continue with a bowel regimen. The patient does have bowel movements every day  3.Severe anxiety and depression managed by psych consultants. Patient is on number of psychotropic agents. Please see HPI for medication list  4. 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 Contributing to her constipation issues  5. Nutritional status is adequate with no concerns. Patient remains obese.  6. Progressive supranuclear ophthalmoplegia. Patient on valproic acid.  7. Skin remains intact without breakdown or decubiti  8. Hypothyroidism currently supplemented     This is a woman who has multiple medical problems including rather severe mental illness. This has resulted in significant self-care deficits. She requires assistance in all activities of daily living. We will work-up this abdominal pain and her complaints. Blood work and ultrasound imaging has been ordered. We will make further recommendations following review of these results.     1. medications are reviewed      2. Continue with rehabilitative, supportive, and or restorative care as ordered and as the patient tolerates     3. Laboratory evaluations will be monitored on an ongoing as needed basis     4. Medications have been cross-referenced with the patient's diagnoses list, and medications reductions have been considered and/or implemented.     5. Pharmacy recommendations are addressed on an ongoing as needed basis.     6. Controlled substances have been electronically scripted every 60 days for opiates and others of similar schedule, and every 6 months for sedative/hypnotics and others of similar schedule.     7. Nursing has been queried about any potential adverse events that need to be reported to me.    Salient information and adjustment of care plan pertaining to this individual patient interaction today are the following:      A. We will continue with restorative  and supportive care as the patient tolerates    B. Laboratory examinations will continue to be drawn on an ongoing as-needed basis. The patient's weight needs to be monitored and if needed we may need to institute appetite stimulating medication    C. The patient's long term prognosis is guarded    Charting is done using voice recognition software and may contain errors which may not have been completely corrected          History of Present Illness  ALT-unit #6     Progress note  DNR CCA        This is a  long-term resident.      chronic abd pain     Patient suffers from schizoaffective disorder, major depression, bipolar disorder, anxiety disorder, paranoia with psychosis. She has had ECT in the past.        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, generalized weakness, impaired mobility, thrombocytopenia, vitamin D deficiency, hypothyroidism     MEDICATION  Facility protocol meds as needed, vitamin B12 1000 mcg daily, nystatin ointment, omeprazole 20 mg daily, vitamin D3 1000 units daily, clonazepam 0.5 mg 3 tablets twice daily, Synthroid 50 mcg daily, Zofran 4 mg every 6 hours as needed, trazodone 100 mg at bedtime, Sinemet  mg 3 times daily, Biotene dry mouth moisturizer, Lipitor 20 mg daily, folic acid 1 mg daily, Effexor  mg daily, valproic acid 250 mg twice daily, ax, artificial tears, Pyridium 200 mg every 12 hours as needed, Estrace 0.1 mg/g insert 0.1 g vaginally at bedtime for 14 days, Abilify 15 mg daily, Celexa 10 mg daily     SOCIAL/FAMILY HISTORY  Reviewed as previously documented      Review of Systems  All system review as allowed by patient condition and nursing input is negative        Active Problems  Problems    · Dementia with parkinsonism (331.82,294.10)  (G20,F02.80)   · Depression with anxiety (300.4) (F41.8)   · Family history of breast cancer (V16.3) (Z80.3)   · Fibrocystic breast (610.1) (N60.19)   · Frontal lobe deficit (799.55) (R41.844)   · Memory loss (780.93) (R41.3)   · Neurocognitive deficits (781.99) (R29.818,R41.89)   · Parkinsonism (332.0) (G20)   · Progressive supranuclear palsy (333.0) (G23.1)   · Schizoaffective disorder, mixed type (295.70) (F25.0)     Past Medical History  Problems    · History of anxiety (V11.8) (Z86.59)   · History of hypothyroidism (V12.29) (Z86.39)   · History of schizoaffective disorder (V11.0) (Z86.59)   · History of vitamin D deficiency (V12.1) (Z86.39)   · History of Polyp of vagina (623.7) (N84.2)   · History of Recurrent major depressive disorder, remission status unspecified (296.30)  (F33.9)   · History of Schizoaffective disorder, bipolar type without good prognostic features (295.70)  (F25.0)     Family History  Mother    · Family history of depression (V17.0) (Z81.8)  Father    · Family history of depression (V17.0) (Z81.8)  Sister    · Family history of breast cancer (V16.3) (Z80.3)  Brother    · Family history of mental disorder (V17.0) (Z81.8)  Maternal Aunt    · Family history of breast cancer (V16.3) (Z80.3)     Social History  Problems    · Born in [place]   · Completed college, bachelors degree   · General Sciences.   · Completed elementary school   · Completed graduate school, masters degree   · Criminal Justice Degree   · Completed high school   · Former smoker (V15.82) (Z87.891)   · Has no children   · Retired from employment   · Was a [employer]  from 1992 to 2009. Before that was [employer]      .   ·  (V61.07) (Z63.4)   ·  from 1989 to 2017 when her  passed away.     Allergies  Medication    · Penicillins   Recorded By: Jessica Felix; 1/16/2014 11:48:33 AM   · Tetracyclines   Recorded By: Jessica Felix; 1/16/2014 11:48:33 AM           Physical  Exam     Vital signs as per nursing/MA documentation  General appearance: Alert and in no acute distress  HEENT: Normal Inspection  Neck - Normal Inspectiopn  Respiratory : No respiratory distress. Lungs are clear   Cardiovascular: heart rate normal. No gallop  Back - normal inspection  Skin inspection:Warm  Musculoskeletal : No deformities  Neuro : Limited exam. Baseline  Psychiatric : Cooperative

## 2023-08-10 ENCOUNTER — NURSING HOME VISIT (OUTPATIENT)
Dept: POST ACUTE CARE | Facility: EXTERNAL LOCATION | Age: 68
End: 2023-08-10
Payer: MEDICARE

## 2023-08-10 DIAGNOSIS — E03.9 HYPOTHYROIDISM, UNSPECIFIED TYPE: ICD-10-CM

## 2023-08-10 DIAGNOSIS — F25.9 SCHIZOAFFECTIVE DISORDER, UNSPECIFIED TYPE (MULTI): ICD-10-CM

## 2023-08-10 DIAGNOSIS — F31.9 BIPOLAR AFFECTIVE DISORDER, REMISSION STATUS UNSPECIFIED (MULTI): ICD-10-CM

## 2023-08-10 DIAGNOSIS — F29 ATYPICAL PSYCHOSIS (MULTI): Primary | ICD-10-CM

## 2023-08-10 PROCEDURE — 99308 SBSQ NF CARE LOW MDM 20: CPT | Performed by: EMERGENCY MEDICINE

## 2023-08-10 NOTE — LETTER
Patient: Daysi John  : 1955    Encounter Date: 08/10/2023     Provider Impression          Jonah     1. Abdominal pain -chronic  2. Chronic constipation. Continue with a bowel regimen. The patient does have bowel movements every day  3.Severe anxiety and depression managed by psych consultants. Patient is on number of psychotropic agents. Please see HPI for medication list  4. 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 Contributing to her constipation issues  5. Nutritional status is adequate with no concerns. Patient remains obese.  6. Progressive supranuclear ophthalmoplegia. Patient on valproic acid.  7. Skin remains intact without breakdown or decubiti  8. Hypothyroidism currently supplemented     This is a woman who has multiple medical problems including rather severe mental illness. This has resulted in significant self-care deficits. She requires assistance in all activities of daily living. We will work-up this abdominal pain and her complaints. Blood work and ultrasound imaging has been ordered. We will make further recommendations following review of these results.     -Fall prevention    -Cognitive engagement     -Monitor and treat blood pressure     -Aggressive decubitus ulcer prevention.     -Bowel and bladder care     -Optimal nutrition and supplementation as needed     -GI  and DVT prophylaxis     -Symptom control     -Ambulation as tolerated     -Will follow    Charting is done using voice recognition software and may contain errors which have not been completely corrected          History of Present Illness  ALT-unit #6     Progress note  DNR CCA        This is a  long-term resident.      chronic abd pain     Patient suffers from schizoaffective disorder, major depression, bipolar disorder, anxiety disorder, paranoia with psychosis. She has had ECT in the past.        [facility]  Schizoaffective disorder, major depression, bipolar disorder, anxiety disorder, paranoia with psychosis having  ECT therapy remotely, progressive supranuclear ophthalmoplegia, frequent falls, protein calorie malnutrition, self-care deficits, mixed a phase he, generalized weakness, impaired mobility, thrombocytopenia, vitamin D deficiency, hypothyroidism     MEDICATION  Facility protocol meds as needed, vitamin B12 1000 mcg daily, nystatin ointment, omeprazole 20 mg daily, vitamin D3 1000 units daily, clonazepam 0.5 mg 3 tablets twice daily, Synthroid 50 mcg daily, Zofran 4 mg every 6 hours as needed, trazodone 100 mg at bedtime, Sinemet  mg 3 times daily, Biotene dry mouth moisturizer, Lipitor 20 mg daily, folic acid 1 mg daily, Effexor  mg daily, valproic acid 250 mg twice daily, ax, artificial tears, Pyridium 200 mg every 12 hours as needed, Estrace 0.1 mg/g insert 0.1 g vaginally at bedtime for 14 days, Abilify 15 mg daily, Celexa 10 mg daily     SOCIAL/FAMILY HISTORY  Reviewed as previously documented      Review of Systems  All system review as allowed by patient condition and nursing input is negative        Active Problems  Problems    · Dementia with parkinsonism (331.82,294.10) (G20,F02.80)   · Depression with anxiety (300.4) (F41.8)   · Family history of breast cancer (V16.3) (Z80.3)   · Fibrocystic breast (610.1) (N60.19)   · Frontal lobe deficit (799.55) (R41.844)   · Memory loss (780.93) (R41.3)   · Neurocognitive deficits (781.99) (R29.818,R41.89)   · Parkinsonism (332.0) (G20)   · Progressive supranuclear palsy (333.0) (G23.1)   · Schizoaffective disorder, mixed type (295.70) (F25.0)     Past Medical History  Problems    · History of anxiety (V11.8) (Z86.59)   · History of hypothyroidism (V12.29) (Z86.39)   · History of schizoaffective disorder (V11.0) (Z86.59)   · History of vitamin D deficiency (V12.1) (Z86.39)   · History of Polyp of vagina (623.7) (N84.2)   · History of Recurrent major depressive disorder, remission status unspecified (296.30)  (F33.9)   · History of Schizoaffective disorder,  bipolar type without good prognostic features (295.70)  (F25.0)     Family History  Mother    · Family history of depression (V17.0) (Z81.8)  Father    · Family history of depression (V17.0) (Z81.8)  Sister    · Family history of breast cancer (V16.3) (Z80.3)  Brother    · Family history of mental disorder (V17.0) (Z81.8)  Maternal Aunt    · Family history of breast cancer (V16.3) (Z80.3)     Social History  Problems    · Born in [place]   · Completed college, bachelors degree   · General Sciences.   · Completed elementary school   · Completed graduate school, masters degree   · Criminal Justice Degree   · Completed high school   · Former smoker (V15.82) (Z87.891)   · Has no children   · Retired from employment   · Was a [employer]  from 1992 to 2009. Before that was [employer]      .   ·  (V61.07) (Z63.4)   ·  from 1989 to 2017 when her  passed away.     Allergies  Medication    · Penicillins   Recorded By: Jessica Felix; 1/16/2014 11:48:33 AM   · Tetracyclines   Recorded By: Jessica Felix; 1/16/2014 11:48:33 AM           Physical Exam     Vital signs as per nursing/MA documentation  General appearance: Alert and in no acute distress  HEENT: Normal Inspection  Neck - Normal Inspectiopn  Respiratory : No respiratory distress. Lungs are clear   Cardiovascular: heart rate normal. No gallop  Back - normal inspection  Skin inspection:Warm  Musculoskeletal : No deformities  Neuro : Limited exam. Baseline  Psychiatric : Cooperative                 Electronically Signed By: Tereso Acharya MD   8/16/23  5:32 PM

## 2023-08-16 NOTE — PROGRESS NOTES
Provider Impression          Jonah     1. Abdominal pain -chronic  2. Chronic constipation. Continue with a bowel regimen. The patient does have bowel movements every day  3.Severe anxiety and depression managed by psych consultants. Patient is on number of psychotropic agents. Please see HPI for medication list  4. 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 Contributing to her constipation issues  5. Nutritional status is adequate with no concerns. Patient remains obese.  6. Progressive supranuclear ophthalmoplegia. Patient on valproic acid.  7. Skin remains intact without breakdown or decubiti  8. Hypothyroidism currently supplemented     This is a woman who has multiple medical problems including rather severe mental illness. This has resulted in significant self-care deficits. She requires assistance in all activities of daily living. We will work-up this abdominal pain and her complaints. Blood work and ultrasound imaging has been ordered. We will make further recommendations following review of these results.     -Fall prevention    -Cognitive engagement     -Monitor and treat blood pressure     -Aggressive decubitus ulcer prevention.     -Bowel and bladder care     -Optimal nutrition and supplementation as needed     -GI  and DVT prophylaxis     -Symptom control     -Ambulation as tolerated     -Will follow    Charting is done using voice recognition software and may contain errors which have not been completely corrected          History of Present Illness  ALT-unit #6     Progress note  DNR CCA        This is a  long-term resident.      chronic abd pain     Patient suffers from schizoaffective disorder, major depression, bipolar disorder, anxiety disorder, paranoia with psychosis. She has had ECT in the past.        [facility]  Schizoaffective disorder, major depression, bipolar disorder, anxiety disorder, paranoia with psychosis having ECT therapy remotely, progressive supranuclear ophthalmoplegia, frequent  falls, protein calorie malnutrition, self-care deficits, mixed a phase he, generalized weakness, impaired mobility, thrombocytopenia, vitamin D deficiency, hypothyroidism     MEDICATION  Facility protocol meds as needed, vitamin B12 1000 mcg daily, nystatin ointment, omeprazole 20 mg daily, vitamin D3 1000 units daily, clonazepam 0.5 mg 3 tablets twice daily, Synthroid 50 mcg daily, Zofran 4 mg every 6 hours as needed, trazodone 100 mg at bedtime, Sinemet  mg 3 times daily, Biotene dry mouth moisturizer, Lipitor 20 mg daily, folic acid 1 mg daily, Effexor  mg daily, valproic acid 250 mg twice daily, ax, artificial tears, Pyridium 200 mg every 12 hours as needed, Estrace 0.1 mg/g insert 0.1 g vaginally at bedtime for 14 days, Abilify 15 mg daily, Celexa 10 mg daily     SOCIAL/FAMILY HISTORY  Reviewed as previously documented      Review of Systems  All system review as allowed by patient condition and nursing input is negative        Active Problems  Problems    · Dementia with parkinsonism (331.82,294.10) (G20,F02.80)   · Depression with anxiety (300.4) (F41.8)   · Family history of breast cancer (V16.3) (Z80.3)   · Fibrocystic breast (610.1) (N60.19)   · Frontal lobe deficit (799.55) (R41.844)   · Memory loss (780.93) (R41.3)   · Neurocognitive deficits (781.99) (R29.818,R41.89)   · Parkinsonism (332.0) (G20)   · Progressive supranuclear palsy (333.0) (G23.1)   · Schizoaffective disorder, mixed type (295.70) (F25.0)     Past Medical History  Problems    · History of anxiety (V11.8) (Z86.59)   · History of hypothyroidism (V12.29) (Z86.39)   · History of schizoaffective disorder (V11.0) (Z86.59)   · History of vitamin D deficiency (V12.1) (Z86.39)   · History of Polyp of vagina (623.7) (N84.2)   · History of Recurrent major depressive disorder, remission status unspecified (296.30)  (F33.9)   · History of Schizoaffective disorder, bipolar type without good prognostic features (295.70)  (F25.0)     Family  History  Mother    · Family history of depression (V17.0) (Z81.8)  Father    · Family history of depression (V17.0) (Z81.8)  Sister    · Family history of breast cancer (V16.3) (Z80.3)  Brother    · Family history of mental disorder (V17.0) (Z81.8)  Maternal Aunt    · Family history of breast cancer (V16.3) (Z80.3)     Social History  Problems    · Born in [place]   · Completed college, bachelors degree   · General Sciences.   · Completed elementary school   · Completed graduate school, masters degree   · Criminal Justice Degree   · Completed high school   · Former smoker (V15.82) (Z87.891)   · Has no children   · Retired from employment   · Was a [employer]  from 1992 to 2009. Before that was [employer]      .   ·  (V61.07) (Z63.4)   ·  from 1989 to 2017 when her  passed away.     Allergies  Medication    · Penicillins   Recorded By: Jessica Felix; 1/16/2014 11:48:33 AM   · Tetracyclines   Recorded By: Jessica Felix; 1/16/2014 11:48:33 AM           Physical Exam     Vital signs as per nursing/MA documentation  General appearance: Alert and in no acute distress  HEENT: Normal Inspection  Neck - Normal Inspectiopn  Respiratory : No respiratory distress. Lungs are clear   Cardiovascular: heart rate normal. No gallop  Back - normal inspection  Skin inspection:Warm  Musculoskeletal : No deformities  Neuro : Limited exam. Baseline  Psychiatric : Cooperative

## 2023-09-21 ENCOUNTER — NURSING HOME VISIT (OUTPATIENT)
Dept: POST ACUTE CARE | Facility: EXTERNAL LOCATION | Age: 68
End: 2023-09-21
Payer: MEDICARE

## 2023-09-21 DIAGNOSIS — F25.9 SCHIZOAFFECTIVE DISORDER, UNSPECIFIED TYPE (MULTI): ICD-10-CM

## 2023-09-21 DIAGNOSIS — F31.9 BIPOLAR AFFECTIVE DISORDER, REMISSION STATUS UNSPECIFIED (MULTI): ICD-10-CM

## 2023-09-21 DIAGNOSIS — E03.9 HYPOTHYROIDISM, UNSPECIFIED TYPE: ICD-10-CM

## 2023-09-21 DIAGNOSIS — E46 PROTEIN-CALORIE MALNUTRITION, UNSPECIFIED SEVERITY (MULTI): ICD-10-CM

## 2023-09-21 DIAGNOSIS — Z00.00 WELLNESS EXAMINATION: Primary | ICD-10-CM

## 2023-09-21 PROCEDURE — G0439 PPPS, SUBSEQ VISIT: HCPCS | Performed by: EMERGENCY MEDICINE

## 2023-09-21 PROCEDURE — 99497 ADVNCD CARE PLAN 30 MIN: CPT | Performed by: EMERGENCY MEDICINE

## 2023-09-21 PROCEDURE — 99308 SBSQ NF CARE LOW MDM 20: CPT | Performed by: EMERGENCY MEDICINE

## 2023-09-21 NOTE — LETTER
Patient: Daysi John  : 1955    Encounter Date: 2023             Patient is being seen for subsequent Medicare wellness and/or physical    Past Medical, Surgical and Family History: reviewed and updated in chart.   Medications and Supplements: Review of all medications by a prescribing practitioner or clinical pharmacist (such as prescriptions, OTCs, herbal therapies and supplements) documented in the medical record.    No, the patient is not using opioids.   Patient Self Assessment of Health Status: fair.   Tobacco use: Non-User The patient is a former cigarette smoker.   Alcohol use: As noted in social history   Illicit drug use: As noted in social history   Current diet: well balanced diet.   Exercise Frequency: the patient does not exercise.   Depression/Suicide Screening:  .   During the past 2 weeks, the patient has not felt down, depressed or hopeless.    During the past 2 weeks, the patient has not felt little interest or pleasure in doing things.    Hearing Impairment: Patient has slight hearing impairment.   Cognitive Impairment: Cognitive impairment was observed.      Bathing: dependent.   Dressing: dependent.   Walking: dependent.   Toileting: dependent.   Feeding: dependent.   Personal Hygiene: dependent.   Managing Finances: dependent.   Shopping: dependent.   Managing Medications: dependent.   Housework / Basic Home Maintenance: dependent.   Handling Transportation: dependent.   Preparing Meals: dependent.   Using the Telephone/ Communication Devices: dependent.    Falls Risk Screening:. Has not fallen in the last 6 months.   Home safety risk factors: none.   Advance directives:. Advanced Care Planning discussed and documented advance care plan or surrogate decision maker documented in the medical record.   A Conversation about Advance directives of at least 16 minutes has occurred. Actual time spent: 20   Topics discussed: Code status per nursing documentation filed with facility.                 Jonah     1. Abdominal pain -chronic  2. Chronic constipation. Continue with a bowel regimen. The patient does have bowel movements every day  3.Severe anxiety and depression managed by psych consultants. Patient is on number of psychotropic agents. Please see HPI for medication list  4. 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 Contributing to her constipation issues  5. Nutritional status is adequate with no concerns. Patient remains obese.  6. Progressive supranuclear ophthalmoplegia. Patient on valproic acid.  7. Skin remains intact without breakdown or decubiti  8. Hypothyroidism currently supplemented     This is a woman who has multiple medical problems including rather severe mental illness. This has resulted in significant self-care deficits. She requires assistance in all activities of daily living. We will work-up this abdominal pain and her complaints. Blood work and ultrasound imaging has been ordered. We will make further recommendations following review of these results.     Provide safe environment for the patient     Continue current medication regimen     OT PT and speech therapy     Bowel and bladder,skin care     Nutritional support     monitor and treat blood glucose     GI  and DVT prophylaxis     PRN medications     Periodic lab work    Regular Follow up    Charting is done using voice recognition software and may contain errors which have not been completely corrected            History of Present Illness  ALT-unit #6     Progress note  DNR CCA        This is a  long-term resident.      chronic abd pain     Patient suffers from schizoaffective disorder, major depression, bipolar disorder, anxiety disorder, paranoia with psychosis. She has had ECT in the past.        [facility]  Schizoaffective disorder, major depression, bipolar disorder, anxiety disorder, paranoia with psychosis having ECT therapy remotely, progressive supranuclear ophthalmoplegia, frequent falls, protein calorie  malnutrition, self-care deficits, mixed a phase he, generalized weakness, impaired mobility, thrombocytopenia, vitamin D deficiency, hypothyroidism     MEDICATION  Facility protocol meds as needed, vitamin B12 1000 mcg daily, nystatin ointment, omeprazole 20 mg daily, vitamin D3 1000 units daily, clonazepam 0.5 mg 3 tablets twice daily, Synthroid 50 mcg daily, Zofran 4 mg every 6 hours as needed, trazodone 100 mg at bedtime, Sinemet  mg 3 times daily, Biotene dry mouth moisturizer, Lipitor 20 mg daily, folic acid 1 mg daily, Effexor  mg daily, valproic acid 250 mg twice daily, ax, artificial tears, Pyridium 200 mg every 12 hours as needed, Estrace 0.1 mg/g insert 0.1 g vaginally at bedtime for 14 days, Abilify 15 mg daily, Celexa 10 mg daily     SOCIAL/FAMILY HISTORY  Reviewed as previously documented      Review of Systems  All system review as allowed by patient condition and nursing input is negative        Active Problems  Problems    · Dementia with parkinsonism (331.82,294.10) (G20,F02.80)   · Depression with anxiety (300.4) (F41.8)   · Family history of breast cancer (V16.3) (Z80.3)   · Fibrocystic breast (610.1) (N60.19)   · Frontal lobe deficit (799.55) (R41.844)   · Memory loss (780.93) (R41.3)   · Neurocognitive deficits (781.99) (R29.818,R41.89)   · Parkinsonism (332.0) (G20)   · Progressive supranuclear palsy (333.0) (G23.1)   · Schizoaffective disorder, mixed type (295.70) (F25.0)     Past Medical History  Problems    · History of anxiety (V11.8) (Z86.59)   · History of hypothyroidism (V12.29) (Z86.39)   · History of schizoaffective disorder (V11.0) (Z86.59)   · History of vitamin D deficiency (V12.1) (Z86.39)   · History of Polyp of vagina (623.7) (N84.2)   · History of Recurrent major depressive disorder, remission status unspecified (296.30)  (F33.9)   · History of Schizoaffective disorder, bipolar type without good prognostic features (295.70)  (F25.0)     Family History  Mother    ·  Family history of depression (V17.0) (Z81.8)  Father    · Family history of depression (V17.0) (Z81.8)  Sister    · Family history of breast cancer (V16.3) (Z80.3)  Brother    · Family history of mental disorder (V17.0) (Z81.8)  Maternal Aunt    · Family history of breast cancer (V16.3) (Z80.3)     Social History  Problems    · Born in [place]   · Completed college, bachelors degree   · General Sciences.   · Completed elementary school   · Completed graduate school, masters degree   · Criminal Justice Degree   · Completed high school   · Former smoker (V15.82) (Z87.891)   · Has no children   · Retired from employment   · Was a [employer]  from 1992 to 2009. Before that was [employer]      .   ·  (V61.07) (Z63.4)   ·  from 1989 to 2017 when her  passed away.     Allergies  Medication    · Penicillins   Recorded By: Jessica Felix; 1/16/2014 11:48:33 AM   · Tetracyclines   Recorded By: Jessica Felix; 1/16/2014 11:48:33 AM           Physical Exam     Vital signs as per nursing/MA documentation  General appearance: Alert and in no acute distress  HEENT: Normal Inspection  Neck - Normal Inspectiopn  Respiratory : No respiratory distress. Lungs are clear   Cardiovascular: heart rate normal. No gallop  Back - normal inspection  Skin inspection:Warm  Musculoskeletal : No deformities  Neuro : Limited exam. Baseline  Psychiatric : Cooperative                 Electronically Signed By: Tereso Acharya MD   9/23/23  8:25 AM

## 2023-09-23 NOTE — PROGRESS NOTES
Patient is being seen for subsequent Medicare wellness and/or physical    Past Medical, Surgical and Family History: reviewed and updated in chart.   Medications and Supplements: Review of all medications by a prescribing practitioner or clinical pharmacist (such as prescriptions, OTCs, herbal therapies and supplements) documented in the medical record.    No, the patient is not using opioids.   Patient Self Assessment of Health Status: fair.   Tobacco use: Non-User The patient is a former cigarette smoker.   Alcohol use: As noted in social history   Illicit drug use: As noted in social history   Current diet: well balanced diet.   Exercise Frequency: the patient does not exercise.   Depression/Suicide Screening:  .   During the past 2 weeks, the patient has not felt down, depressed or hopeless.    During the past 2 weeks, the patient has not felt little interest or pleasure in doing things.    Hearing Impairment: Patient has slight hearing impairment.   Cognitive Impairment: Cognitive impairment was observed.      Bathing: dependent.   Dressing: dependent.   Walking: dependent.   Toileting: dependent.   Feeding: dependent.   Personal Hygiene: dependent.   Managing Finances: dependent.   Shopping: dependent.   Managing Medications: dependent.   Housework / Basic Home Maintenance: dependent.   Handling Transportation: dependent.   Preparing Meals: dependent.   Using the Telephone/ Communication Devices: dependent.    Falls Risk Screening:. Has not fallen in the last 6 months.   Home safety risk factors: none.   Advance directives:. Advanced Care Planning discussed and documented advance care plan or surrogate decision maker documented in the medical record.   A Conversation about Advance directives of at least 16 minutes has occurred. Actual time spent: 20   Topics discussed: Code status per nursing documentation filed with facility.                Jonah     1. Abdominal pain -chronic  2. Chronic  constipation. Continue with a bowel regimen. The patient does have bowel movements every day  3.Severe anxiety and depression managed by psych consultants. Patient is on number of psychotropic agents. Please see HPI for medication list  4. 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 Contributing to her constipation issues  5. Nutritional status is adequate with no concerns. Patient remains obese.  6. Progressive supranuclear ophthalmoplegia. Patient on valproic acid.  7. Skin remains intact without breakdown or decubiti  8. Hypothyroidism currently supplemented     This is a woman who has multiple medical problems including rather severe mental illness. This has resulted in significant self-care deficits. She requires assistance in all activities of daily living. We will work-up this abdominal pain and her complaints. Blood work and ultrasound imaging has been ordered. We will make further recommendations following review of these results.     Provide safe environment for the patient     Continue current medication regimen     OT PT and speech therapy     Bowel and bladder,skin care     Nutritional support     monitor and treat blood glucose     GI  and DVT prophylaxis     PRN medications     Periodic lab work    Regular Follow up    Charting is done using voice recognition software and may contain errors which have not been completely corrected            History of Present Illness  ALT-unit #6     Progress note  DNR CCA        This is a  long-term resident.      chronic abd pain     Patient suffers from schizoaffective disorder, major depression, bipolar disorder, anxiety disorder, paranoia with psychosis. She has had ECT in the past.        [facility]  Schizoaffective disorder, major depression, bipolar disorder, anxiety disorder, paranoia with psychosis having ECT therapy remotely, progressive supranuclear ophthalmoplegia, frequent falls, protein calorie malnutrition, self-care deficits, mixed a phase he, generalized  weakness, impaired mobility, thrombocytopenia, vitamin D deficiency, hypothyroidism     MEDICATION  Facility protocol meds as needed, vitamin B12 1000 mcg daily, nystatin ointment, omeprazole 20 mg daily, vitamin D3 1000 units daily, clonazepam 0.5 mg 3 tablets twice daily, Synthroid 50 mcg daily, Zofran 4 mg every 6 hours as needed, trazodone 100 mg at bedtime, Sinemet  mg 3 times daily, Biotene dry mouth moisturizer, Lipitor 20 mg daily, folic acid 1 mg daily, Effexor  mg daily, valproic acid 250 mg twice daily, ax, artificial tears, Pyridium 200 mg every 12 hours as needed, Estrace 0.1 mg/g insert 0.1 g vaginally at bedtime for 14 days, Abilify 15 mg daily, Celexa 10 mg daily     SOCIAL/FAMILY HISTORY  Reviewed as previously documented      Review of Systems  All system review as allowed by patient condition and nursing input is negative        Active Problems  Problems    · Dementia with parkinsonism (331.82,294.10) (G20,F02.80)   · Depression with anxiety (300.4) (F41.8)   · Family history of breast cancer (V16.3) (Z80.3)   · Fibrocystic breast (610.1) (N60.19)   · Frontal lobe deficit (799.55) (R41.844)   · Memory loss (780.93) (R41.3)   · Neurocognitive deficits (781.99) (R29.818,R41.89)   · Parkinsonism (332.0) (G20)   · Progressive supranuclear palsy (333.0) (G23.1)   · Schizoaffective disorder, mixed type (295.70) (F25.0)     Past Medical History  Problems    · History of anxiety (V11.8) (Z86.59)   · History of hypothyroidism (V12.29) (Z86.39)   · History of schizoaffective disorder (V11.0) (Z86.59)   · History of vitamin D deficiency (V12.1) (Z86.39)   · History of Polyp of vagina (623.7) (N84.2)   · History of Recurrent major depressive disorder, remission status unspecified (296.30)  (F33.9)   · History of Schizoaffective disorder, bipolar type without good prognostic features (295.70)  (F25.0)     Family History  Mother    · Family history of depression (V17.0) (Z81.8)  Father    · Family  history of depression (V17.0) (Z81.8)  Sister    · Family history of breast cancer (V16.3) (Z80.3)  Brother    · Family history of mental disorder (V17.0) (Z81.8)  Maternal Aunt    · Family history of breast cancer (V16.3) (Z80.3)     Social History  Problems    · Born in [place]   · Completed college, bachelors degree   · General Sciences.   · Completed elementary school   · Completed graduate school, masters degree   · Criminal Justice Degree   · Completed high school   · Former smoker (V15.82) (Z87.891)   · Has no children   · Retired from employment   · Was a [employer]  from 1992 to 2009. Before that was [employer]      .   ·  (V61.07) (Z63.4)   ·  from 1989 to 2017 when her  passed away.     Allergies  Medication    · Penicillins   Recorded By: Jessica Felix; 1/16/2014 11:48:33 AM   · Tetracyclines   Recorded By: Jessica Felix; 1/16/2014 11:48:33 AM           Physical Exam     Vital signs as per nursing/MA documentation  General appearance: Alert and in no acute distress  HEENT: Normal Inspection  Neck - Normal Inspectiopn  Respiratory : No respiratory distress. Lungs are clear   Cardiovascular: heart rate normal. No gallop  Back - normal inspection  Skin inspection:Warm  Musculoskeletal : No deformities  Neuro : Limited exam. Baseline  Psychiatric : Cooperative

## 2023-10-17 ENCOUNTER — NURSING HOME VISIT (OUTPATIENT)
Dept: POST ACUTE CARE | Facility: EXTERNAL LOCATION | Age: 68
End: 2023-10-17
Payer: MEDICARE

## 2023-10-17 DIAGNOSIS — F29 ATYPICAL PSYCHOSIS (MULTI): ICD-10-CM

## 2023-10-17 DIAGNOSIS — E46 PROTEIN-CALORIE MALNUTRITION, UNSPECIFIED SEVERITY (MULTI): Primary | ICD-10-CM

## 2023-10-17 DIAGNOSIS — F31.9 BIPOLAR AFFECTIVE DISORDER, REMISSION STATUS UNSPECIFIED (MULTI): ICD-10-CM

## 2023-10-17 DIAGNOSIS — E03.9 HYPOTHYROIDISM, UNSPECIFIED TYPE: ICD-10-CM

## 2023-10-17 DIAGNOSIS — F25.9 SCHIZOAFFECTIVE DISORDER, UNSPECIFIED TYPE (MULTI): ICD-10-CM

## 2023-10-17 PROCEDURE — 99308 SBSQ NF CARE LOW MDM 20: CPT | Performed by: EMERGENCY MEDICINE

## 2023-10-17 NOTE — LETTER
Patient: Daysi John  : 1955    Encounter Date: 10/17/2023     Provider Impression          Jonah     1. Abdominal pain -chronic  2. Chronic constipation. Continue with a bowel regimen. The patient does have bowel movements every day  3.Severe anxiety and depression managed by psych consultants. Patient is on number of psychotropic agents. Please see HPI for medication list  4. 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 Contributing to her constipation issues  5. Nutritional status is adequate with no concerns. Patient remains obese.  6. Progressive supranuclear ophthalmoplegia. Patient on valproic acid.  7. Skin remains intact without breakdown or decubiti  8. Hypothyroidism currently supplemented     This is a woman who has multiple medical problems including rather severe mental illness. This has resulted in significant self-care deficits. She requires assistance in all activities of daily living. We will work-up this abdominal pain and her complaints. Blood work and ultrasound imaging has been ordered. We will make further recommendations following review of these results.     Provide safe environment for the patient     Continue current medication regimen     OT PT and speech therapy     Bowel and bladder,skin care     Nutritional support     monitor and treat blood glucose     GI  and DVT prophylaxis     PRN medications     Periodic lab work    Regular Follow up    Charting is done using voice recognition software and may contain errors which have not been completely corrected          History of Present Illness  ALT-unit #6     Progress note  DNR CCA        This is a  long-term resident.      chronic abd pain     Patient suffers from schizoaffective disorder, major depression, bipolar disorder, anxiety disorder, paranoia with psychosis. She has had ECT in the past.        [facility]  Schizoaffective disorder, major depression, bipolar disorder, anxiety disorder, paranoia with psychosis having ECT  therapy remotely, progressive supranuclear ophthalmoplegia, frequent falls, protein calorie malnutrition, self-care deficits, mixed a phase he, generalized weakness, impaired mobility, thrombocytopenia, vitamin D deficiency, hypothyroidism     MEDICATION  Facility protocol meds as needed, vitamin B12 1000 mcg daily, nystatin ointment, omeprazole 20 mg daily, vitamin D3 1000 units daily, clonazepam 0.5 mg 3 tablets twice daily, Synthroid 50 mcg daily, Zofran 4 mg every 6 hours as needed, trazodone 100 mg at bedtime, Sinemet  mg 3 times daily, Biotene dry mouth moisturizer, Lipitor 20 mg daily, folic acid 1 mg daily, Effexor  mg daily, valproic acid 250 mg twice daily, ax, artificial tears, Pyridium 200 mg every 12 hours as needed, Estrace 0.1 mg/g insert 0.1 g vaginally at bedtime for 14 days, Abilify 15 mg daily, Celexa 10 mg daily     SOCIAL/FAMILY HISTORY  Reviewed as previously documented      Review of Systems  All system review as allowed by patient condition and nursing input is negative        Active Problems  Problems    · Dementia with parkinsonism (331.82,294.10) (G20,F02.80)   · Depression with anxiety (300.4) (F41.8)   · Family history of breast cancer (V16.3) (Z80.3)   · Fibrocystic breast (610.1) (N60.19)   · Frontal lobe deficit (799.55) (R41.844)   · Memory loss (780.93) (R41.3)   · Neurocognitive deficits (781.99) (R29.818,R41.89)   · Parkinsonism (332.0) (G20)   · Progressive supranuclear palsy (333.0) (G23.1)   · Schizoaffective disorder, mixed type (295.70) (F25.0)     Past Medical History  Problems    · History of anxiety (V11.8) (Z86.59)   · History of hypothyroidism (V12.29) (Z86.39)   · History of schizoaffective disorder (V11.0) (Z86.59)   · History of vitamin D deficiency (V12.1) (Z86.39)   · History of Polyp of vagina (623.7) (N84.2)   · History of Recurrent major depressive disorder, remission status unspecified (296.30)  (F33.9)   · History of Schizoaffective disorder,  bipolar type without good prognostic features (295.70)  (F25.0)     Family History  Mother    · Family history of depression (V17.0) (Z81.8)  Father    · Family history of depression (V17.0) (Z81.8)  Sister    · Family history of breast cancer (V16.3) (Z80.3)  Brother    · Family history of mental disorder (V17.0) (Z81.8)  Maternal Aunt    · Family history of breast cancer (V16.3) (Z80.3)     Social History  Problems    · Born in [place]   · Completed college, bachelors degree   · General Sciences.   · Completed elementary school   · Completed graduate school, masters degree   · Criminal Justice Degree   · Completed high school   · Former smoker (V15.82) (Z87.891)   · Has no children   · Retired from employment   · Was a [employer]  from 1992 to 2009. Before that was [employer]      .   ·  (V61.07) (Z63.4)   ·  from 1989 to 2017 when her  passed away.     Allergies  Medication    · Penicillins   Recorded By: Jessica Felix; 1/16/2014 11:48:33 AM   · Tetracyclines   Recorded By: Jessica Felix; 1/16/2014 11:48:33 AM           Physical Exam     Vital signs as per nursing/MA documentation  General appearance: Alert and in no acute distress  HEENT: Normal Inspection  Neck - Normal Inspectiopn  Respiratory : No respiratory distress. Lungs are clear   Cardiovascular: heart rate normal. No gallop  Back - normal inspection  Skin inspection:Warm  Musculoskeletal : No deformities  Neuro : Limited exam. Baseline  Psychiatric : Cooperative                 Electronically Signed By: Tereso Acharya MD   10/19/23  4:41 PM

## 2023-10-19 NOTE — PROGRESS NOTES
Provider Impression          Jonah     1. Abdominal pain -chronic  2. Chronic constipation. Continue with a bowel regimen. The patient does have bowel movements every day  3.Severe anxiety and depression managed by psych consultants. Patient is on number of psychotropic agents. Please see HPI for medication list  4. 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 Contributing to her constipation issues  5. Nutritional status is adequate with no concerns. Patient remains obese.  6. Progressive supranuclear ophthalmoplegia. Patient on valproic acid.  7. Skin remains intact without breakdown or decubiti  8. Hypothyroidism currently supplemented     This is a woman who has multiple medical problems including rather severe mental illness. This has resulted in significant self-care deficits. She requires assistance in all activities of daily living. We will work-up this abdominal pain and her complaints. Blood work and ultrasound imaging has been ordered. We will make further recommendations following review of these results.     Provide safe environment for the patient     Continue current medication regimen     OT PT and speech therapy     Bowel and bladder,skin care     Nutritional support     monitor and treat blood glucose     GI  and DVT prophylaxis     PRN medications     Periodic lab work    Regular Follow up    Charting is done using voice recognition software and may contain errors which have not been completely corrected          History of Present Illness  ALT-unit #6     Progress note  DNR CCA        This is a  long-term resident.      chronic abd pain     Patient suffers from schizoaffective disorder, major depression, bipolar disorder, anxiety disorder, paranoia with psychosis. She has had ECT in the past.        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, generalized weakness, impaired mobility, thrombocytopenia, vitamin D deficiency, hypothyroidism     MEDICATION  Facility protocol meds as needed, vitamin B12 1000 mcg daily, nystatin ointment, omeprazole 20 mg daily, vitamin D3 1000 units daily, clonazepam 0.5 mg 3 tablets twice daily, Synthroid 50 mcg daily, Zofran 4 mg every 6 hours as needed, trazodone 100 mg at bedtime, Sinemet  mg 3 times daily, Biotene dry mouth moisturizer, Lipitor 20 mg daily, folic acid 1 mg daily, Effexor  mg daily, valproic acid 250 mg twice daily, ax, artificial tears, Pyridium 200 mg every 12 hours as needed, Estrace 0.1 mg/g insert 0.1 g vaginally at bedtime for 14 days, Abilify 15 mg daily, Celexa 10 mg daily     SOCIAL/FAMILY HISTORY  Reviewed as previously documented      Review of Systems  All system review as allowed by patient condition and nursing input is negative        Active Problems  Problems    · Dementia with parkinsonism (331.82,294.10) (G20,F02.80)   · Depression with anxiety (300.4) (F41.8)   · Family history of breast cancer (V16.3) (Z80.3)   · Fibrocystic breast (610.1) (N60.19)   · Frontal lobe deficit (799.55) (R41.844)   · Memory loss (780.93) (R41.3)   · Neurocognitive deficits (781.99) (R29.818,R41.89)   · Parkinsonism (332.0) (G20)   · Progressive supranuclear palsy (333.0) (G23.1)   · Schizoaffective disorder, mixed type (295.70) (F25.0)     Past Medical History  Problems    · History of anxiety (V11.8) (Z86.59)   · History of hypothyroidism (V12.29) (Z86.39)   · History of schizoaffective disorder (V11.0) (Z86.59)   · History of vitamin D deficiency (V12.1) (Z86.39)   · History of Polyp of vagina (623.7) (N84.2)   · History of Recurrent major depressive disorder, remission status unspecified (296.30)  (F33.9)   · History of Schizoaffective disorder, bipolar type without good prognostic features (295.70)  (F25.0)     Family  History  Mother    · Family history of depression (V17.0) (Z81.8)  Father    · Family history of depression (V17.0) (Z81.8)  Sister    · Family history of breast cancer (V16.3) (Z80.3)  Brother    · Family history of mental disorder (V17.0) (Z81.8)  Maternal Aunt    · Family history of breast cancer (V16.3) (Z80.3)     Social History  Problems    · Born in [place]   · Completed college, bachelors degree   · General Sciences.   · Completed elementary school   · Completed graduate school, masters degree   · Criminal Justice Degree   · Completed high school   · Former smoker (V15.82) (Z87.891)   · Has no children   · Retired from employment   · Was a [employer]  from 1992 to 2009. Before that was [employer]      .   ·  (V61.07) (Z63.4)   ·  from 1989 to 2017 when her  passed away.     Allergies  Medication    · Penicillins   Recorded By: Jessica Felix; 1/16/2014 11:48:33 AM   · Tetracyclines   Recorded By: Jessica Felix; 1/16/2014 11:48:33 AM           Physical Exam     Vital signs as per nursing/MA documentation  General appearance: Alert and in no acute distress  HEENT: Normal Inspection  Neck - Normal Inspectiopn  Respiratory : No respiratory distress. Lungs are clear   Cardiovascular: heart rate normal. No gallop  Back - normal inspection  Skin inspection:Warm  Musculoskeletal : No deformities  Neuro : Limited exam. Baseline  Psychiatric : Cooperative

## 2023-11-22 ENCOUNTER — NURSING HOME VISIT (OUTPATIENT)
Dept: POST ACUTE CARE | Facility: EXTERNAL LOCATION | Age: 68
End: 2023-11-22
Payer: MEDICARE

## 2023-11-22 DIAGNOSIS — F31.9 BIPOLAR AFFECTIVE DISORDER, REMISSION STATUS UNSPECIFIED (MULTI): ICD-10-CM

## 2023-11-22 DIAGNOSIS — E03.9 HYPOTHYROIDISM, UNSPECIFIED TYPE: ICD-10-CM

## 2023-11-22 DIAGNOSIS — F25.9 SCHIZOAFFECTIVE DISORDER, UNSPECIFIED TYPE (MULTI): ICD-10-CM

## 2023-11-22 DIAGNOSIS — E46 PROTEIN-CALORIE MALNUTRITION, UNSPECIFIED SEVERITY (MULTI): Primary | ICD-10-CM

## 2023-11-22 PROCEDURE — 99309 SBSQ NF CARE MODERATE MDM 30: CPT | Performed by: EMERGENCY MEDICINE

## 2023-11-22 NOTE — LETTER
Patient: Daysi John  : 1955    Encounter Date: 2023     Provider Impression          Jonah     1. Abdominal pain -chronic  2. Chronic constipation. Continue with a bowel regimen. The patient does have bowel movements every day  3.Severe anxiety and depression managed by psych consultants. Patient is on number of psychotropic agents. Please see HPI for medication list  4. 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 Contributing to her constipation issues  5. Nutritional status is adequate with no concerns. Patient remains obese.  6. Progressive supranuclear ophthalmoplegia. Patient on valproic acid.  7. Skin remains intact without breakdown or decubiti  8. Hypothyroidism currently supplemented     This is a woman who has multiple medical problems including rather severe mental illness. This has resulted in significant self-care deficits. She requires assistance in all activities of daily living. We will work-up this abdominal pain and her complaints. Blood work and ultrasound imaging has been ordered. We will make further recommendations following review of these results.     Rx list reviewed.   PT and OT evaluation is in the process.   Routine safety measures, fall precautions, risk modification and alarm placement if needed for prevention of falls.   Skin care precautions, prevention of pressures sores at pressure points assessed.   Pt needs to be monitored frequently by nursing staff particularly at night time.   Any confusion, agitation or behavioural disturbance needs to be attended, as per home policy   rapid covid Ag assay need to be done, notify if positive.   If needed appropriate measures to be taken for alarm placements and assisted devices, pt was told not to get up and ambulate at night unless help and assist available at bedside,   labs will be done as per our routine protocol.   PO intake need to be monitored if consuming po.       Charting is done using voice recognition software  and may contain errors which have not been completely corrected            History of Present Illness  ALT-unit #6     Progress note  DNR CCA        This is a  long-term resident.      chronic abd pain     Patient suffers from schizoaffective disorder, major depression, bipolar disorder, anxiety disorder, paranoia with psychosis. She has had ECT in the past.        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, generalized weakness, impaired mobility, thrombocytopenia, vitamin D deficiency, hypothyroidism     MEDICATION  Facility protocol meds as needed, vitamin B12 1000 mcg daily, nystatin ointment, omeprazole 20 mg daily, vitamin D3 1000 units daily, clonazepam 0.5 mg 3 tablets twice daily, Synthroid 50 mcg daily, Zofran 4 mg every 6 hours as needed, trazodone 100 mg at bedtime, Sinemet  mg 3 times daily, Biotene dry mouth moisturizer, Lipitor 20 mg daily, folic acid 1 mg daily, Effexor  mg daily, valproic acid 250 mg twice daily, ax, artificial tears, Pyridium 200 mg every 12 hours as needed, Estrace 0.1 mg/g insert 0.1 g vaginally at bedtime for 14 days, Abilify 15 mg daily, Celexa 10 mg daily     SOCIAL/FAMILY HISTORY  Reviewed as previously documented      Review of Systems  All system review as allowed by patient condition and nursing input is negative        Active Problems  Problems    · Dementia with parkinsonism (331.82,294.10) (G20,F02.80)   · Depression with anxiety (300.4) (F41.8)   · Family history of breast cancer (V16.3) (Z80.3)   · Fibrocystic breast (610.1) (N60.19)   · Frontal lobe deficit (799.55) (R41.844)   · Memory loss (780.93) (R41.3)   · Neurocognitive deficits (781.99) (R29.818,R41.89)   · Parkinsonism (332.0) (G20)   · Progressive supranuclear palsy (333.0)  (G23.1)   · Schizoaffective disorder, mixed type (295.70) (F25.0)     Past Medical History  Problems    · History of anxiety (V11.8) (Z86.59)   · History of hypothyroidism (V12.29) (Z86.39)   · History of schizoaffective disorder (V11.0) (Z86.59)   · History of vitamin D deficiency (V12.1) (Z86.39)   · History of Polyp of vagina (623.7) (N84.2)   · History of Recurrent major depressive disorder, remission status unspecified (296.30)  (F33.9)   · History of Schizoaffective disorder, bipolar type without good prognostic features (295.70)  (F25.0)     Family History  Mother    · Family history of depression (V17.0) (Z81.8)  Father    · Family history of depression (V17.0) (Z81.8)  Sister    · Family history of breast cancer (V16.3) (Z80.3)  Brother    · Family history of mental disorder (V17.0) (Z81.8)  Maternal Aunt    · Family history of breast cancer (V16.3) (Z80.3)     Social History  Problems    · Born in [place]   · Completed college, bachelors degree   · General Sciences.   · Completed elementary school   · Completed graduate school, masters degree   · Criminal Justice Degree   · Completed high school   · Former smoker (V15.82) (Z87.891)   · Has no children   · Retired from employment   · Was a [employer]  from 1992 to 2009. Before that was [employer]      .   ·  (V61.07) (Z63.4)   ·  from 1989 to 2017 when her  passed away.     Allergies  Medication    · Penicillins   Recorded By: Jessica Felix; 1/16/2014 11:48:33 AM   · Tetracyclines   Recorded By: Jessica Felix; 1/16/2014 11:48:33 AM           Physical Exam     Vital signs as per nursing/MA documentation  General appearance: Alert and in no acute distress  HEENT: Normal Inspection  Neck - Normal Inspectiopn  Respiratory : No respiratory distress. Lungs are clear   Cardiovascular: heart rate normal. No gallop  Back - normal inspection  Skin inspection:Warm  Musculoskeletal : No deformities  Neuro :  Limited exam. Baseline  Psychiatric : Cooperative               Electronically Signed By: Tereso Acharya MD   11/28/23  5:56 PM

## 2023-11-28 NOTE — PROGRESS NOTES
Provider Impression          Jonah     1. Abdominal pain -chronic  2. Chronic constipation. Continue with a bowel regimen. The patient does have bowel movements every day  3.Severe anxiety and depression managed by psych consultants. Patient is on number of psychotropic agents. Please see HPI for medication list  4. 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 Contributing to her constipation issues  5. Nutritional status is adequate with no concerns. Patient remains obese.  6. Progressive supranuclear ophthalmoplegia. Patient on valproic acid.  7. Skin remains intact without breakdown or decubiti  8. Hypothyroidism currently supplemented     This is a woman who has multiple medical problems including rather severe mental illness. This has resulted in significant self-care deficits. She requires assistance in all activities of daily living. We will work-up this abdominal pain and her complaints. Blood work and ultrasound imaging has been ordered. We will make further recommendations following review of these results.     Rx list reviewed.   PT and OT evaluation is in the process.   Routine safety measures, fall precautions, risk modification and alarm placement if needed for prevention of falls.   Skin care precautions, prevention of pressures sores at pressure points assessed.   Pt needs to be monitored frequently by nursing staff particularly at night time.   Any confusion, agitation or behavioural disturbance needs to be attended, as per home policy   rapid covid Ag assay need to be done, notify if positive.   If needed appropriate measures to be taken for alarm placements and assisted devices, pt was told not to get up and ambulate at night unless help and assist available at bedside,   labs will be done as per our routine protocol.   PO intake need to be monitored if consuming po.       Charting is done using voice recognition software and may contain errors which have not been completely corrected             History of Present Illness  ALT-unit #6     Progress note  DNR CCA        This is a  long-term resident.      chronic abd pain     Patient suffers from schizoaffective disorder, major depression, bipolar disorder, anxiety disorder, paranoia with psychosis. She has had ECT in the past.        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, generalized weakness, impaired mobility, thrombocytopenia, vitamin D deficiency, hypothyroidism     MEDICATION  Facility protocol meds as needed, vitamin B12 1000 mcg daily, nystatin ointment, omeprazole 20 mg daily, vitamin D3 1000 units daily, clonazepam 0.5 mg 3 tablets twice daily, Synthroid 50 mcg daily, Zofran 4 mg every 6 hours as needed, trazodone 100 mg at bedtime, Sinemet  mg 3 times daily, Biotene dry mouth moisturizer, Lipitor 20 mg daily, folic acid 1 mg daily, Effexor  mg daily, valproic acid 250 mg twice daily, ax, artificial tears, Pyridium 200 mg every 12 hours as needed, Estrace 0.1 mg/g insert 0.1 g vaginally at bedtime for 14 days, Abilify 15 mg daily, Celexa 10 mg daily     SOCIAL/FAMILY HISTORY  Reviewed as previously documented      Review of Systems  All system review as allowed by patient condition and nursing input is negative        Active Problems  Problems    · Dementia with parkinsonism (331.82,294.10) (G20,F02.80)   · Depression with anxiety (300.4) (F41.8)   · Family history of breast cancer (V16.3) (Z80.3)   · Fibrocystic breast (610.1) (N60.19)   · Frontal lobe deficit (799.55) (R41.844)   · Memory loss (780.93) (R41.3)   · Neurocognitive deficits (781.99) (R29.818,R41.89)   · Parkinsonism (332.0) (G20)   · Progressive supranuclear palsy (333.0) (G23.1)   · Schizoaffective disorder, mixed type (295.70) (F25.0)     Past  Medical History  Problems    · History of anxiety (V11.8) (Z86.59)   · History of hypothyroidism (V12.29) (Z86.39)   · History of schizoaffective disorder (V11.0) (Z86.59)   · History of vitamin D deficiency (V12.1) (Z86.39)   · History of Polyp of vagina (623.7) (N84.2)   · History of Recurrent major depressive disorder, remission status unspecified (296.30)  (F33.9)   · History of Schizoaffective disorder, bipolar type without good prognostic features (295.70)  (F25.0)     Family History  Mother    · Family history of depression (V17.0) (Z81.8)  Father    · Family history of depression (V17.0) (Z81.8)  Sister    · Family history of breast cancer (V16.3) (Z80.3)  Brother    · Family history of mental disorder (V17.0) (Z81.8)  Maternal Aunt    · Family history of breast cancer (V16.3) (Z80.3)     Social History  Problems    · Born in [place]   · Completed college, bachelors degree   · General Sciences.   · Completed elementary school   · Completed graduate school, masters degree   · Criminal Justice Degree   · Completed high school   · Former smoker (V15.82) (Z87.891)   · Has no children   · Retired from employment   · Was a [facility]  from 1992 to 2009. Before that was [employer]      .   ·  (V61.07) (Z63.4)   ·  from 1989 to 2017 when her  passed away.     Allergies  Medication    · Penicillins   Recorded By: Jessica Felix; 1/16/2014 11:48:33 AM   · Tetracyclines   Recorded By: Jessica Felix; 1/16/2014 11:48:33 AM           Physical Exam     Vital signs as per nursing/MA documentation  General appearance: Alert and in no acute distress  HEENT: Normal Inspection  Neck - Normal Inspectiopn  Respiratory : No respiratory distress. Lungs are clear   Cardiovascular: heart rate normal. No gallop  Back - normal inspection  Skin inspection:Warm  Musculoskeletal : No deformities  Neuro : Limited exam. Baseline  Psychiatric : Cooperative

## 2023-12-12 ENCOUNTER — NURSING HOME VISIT (OUTPATIENT)
Dept: POST ACUTE CARE | Facility: EXTERNAL LOCATION | Age: 68
End: 2023-12-12
Payer: MEDICARE

## 2023-12-12 DIAGNOSIS — F25.9 SCHIZOAFFECTIVE DISORDER, UNSPECIFIED TYPE (MULTI): ICD-10-CM

## 2023-12-12 DIAGNOSIS — E46 PROTEIN-CALORIE MALNUTRITION, UNSPECIFIED SEVERITY (MULTI): ICD-10-CM

## 2023-12-12 DIAGNOSIS — F29 ATYPICAL PSYCHOSIS (MULTI): Primary | ICD-10-CM

## 2023-12-12 DIAGNOSIS — E03.9 HYPOTHYROIDISM, UNSPECIFIED TYPE: ICD-10-CM

## 2023-12-12 PROCEDURE — 99308 SBSQ NF CARE LOW MDM 20: CPT | Performed by: EMERGENCY MEDICINE

## 2023-12-12 NOTE — LETTER
Patient: Daysi John  : 1955    Encounter Date: 2023     Provider Impression          Jonah     1. Abdominal pain -chronic  2. Chronic constipation. Continue with a bowel regimen. The patient does have bowel movements every day  3.Severe anxiety and depression managed by psych consultants. Patient is on number of psychotropic agents. Please see HPI for medication list  4. 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 Contributing to her constipation issues  5. Nutritional status is adequate with no concerns. Patient remains obese.  6. Progressive supranuclear ophthalmoplegia. Patient on valproic acid.  7. Skin remains intact without breakdown or decubiti  8. Hypothyroidism currently supplemented     This is a woman who has multiple medical problems including rather severe mental illness. This has resulted in significant self-care deficits. She requires assistance in all activities of daily living. We will work-up this abdominal pain and her complaints. Blood work and ultrasound imaging has been ordered. We will make further recommendations following review of these results.     1. medications are reviewed      2. Continue with rehabilitative, supportive, and or restorative care as ordered and as the patient tolerates     3. Laboratory evaluations will be monitored on an ongoing as needed basis     4. Medications have been cross-referenced with the patient's diagnoses list, and medications reductions have been considered and/or implemented.     5. Pharmacy recommendations are addressed on an ongoing as needed basis.     6. Controlled substances have been electronically scripted every 60 days for opiates and others of similar schedule, and every 6 months for sedative/hypnotics and others of similar schedule.     7. Nursing has been queried about any potential adverse events that need to be reported to me.    Salient information and adjustment of care plan pertaining to this individual patient  interaction today are the following:      A. We will continue with restorative and supportive care as the patient tolerates    B. Laboratory examinations will continue to be drawn on an ongoing as-needed basis. The patient's weight needs to be monitored and if needed we may need to institute appetite stimulating medication    C. The patient's long term prognosis is guarded    Charting is done using voice recognition software and may contain errors which may not have been completely corrected              History of Present Illness  ALT-unit #6     Progress note  DNR CCA        This is a  long-term resident.      chronic abd pain     Patient suffers from schizoaffective disorder, major depression, bipolar disorder, anxiety disorder, paranoia with psychosis. She has had ECT in the past.        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, generalized weakness, impaired mobility, thrombocytopenia, vitamin D deficiency, hypothyroidism     MEDICATION  Facility protocol meds as needed, vitamin B12 1000 mcg daily, nystatin ointment, omeprazole 20 mg daily, vitamin D3 1000 units daily, clonazepam 0.5 mg 3 tablets twice daily, Synthroid 50 mcg daily, Zofran 4 mg every 6 hours as needed, trazodone 100 mg at bedtime, Sinemet  mg 3 times daily, Biotene dry mouth moisturizer, Lipitor 20 mg daily, folic acid 1 mg daily, Effexor  mg daily, valproic acid 250 mg twice daily, ax, artificial tears, Pyridium 200 mg every 12 hours as needed, Estrace 0.1 mg/g insert 0.1 g vaginally at bedtime for 14 days, Abilify 15 mg daily, Celexa 10 mg daily     SOCIAL/FAMILY HISTORY  Reviewed as previously documented      Review of Systems  All system review as allowed by patient condition and nursing input is negative         Active Problems  Problems    · Dementia with parkinsonism (331.82,294.10) (G20,F02.80)   · Depression with anxiety (300.4) (F41.8)   · Family history of breast cancer (V16.3) (Z80.3)   · Fibrocystic breast (610.1) (N60.19)   · Frontal lobe deficit (799.55) (R41.844)   · Memory loss (780.93) (R41.3)   · Neurocognitive deficits (781.99) (R29.818,R41.89)   · Parkinsonism (332.0) (G20)   · Progressive supranuclear palsy (333.0) (G23.1)   · Schizoaffective disorder, mixed type (295.70) (F25.0)     Past Medical History  Problems    · History of anxiety (V11.8) (Z86.59)   · History of hypothyroidism (V12.29) (Z86.39)   · History of schizoaffective disorder (V11.0) (Z86.59)   · History of vitamin D deficiency (V12.1) (Z86.39)   · History of Polyp of vagina (623.7) (N84.2)   · History of Recurrent major depressive disorder, remission status unspecified (296.30)  (F33.9)   · History of Schizoaffective disorder, bipolar type without good prognostic features (295.70)  (F25.0)     Family History  Mother    · Family history of depression (V17.0) (Z81.8)  Father    · Family history of depression (V17.0) (Z81.8)  Sister    · Family history of breast cancer (V16.3) (Z80.3)  Brother    · Family history of mental disorder (V17.0) (Z81.8)  Maternal Aunt    · Family history of breast cancer (V16.3) (Z80.3)     Social History  Problems    · Born in [place]   · Completed college, bachelors degree   · General Sciences.   · Completed elementary school   · Completed graduate school, masters degree   · Criminal Justice Degree   · Completed high school   · Former smoker (V15.82) (Z87.891)   · Has no children   · Retired from employment   · Was a [employer] [facility] [place]  from 1992 to 2009. Before that was [employer]      .   ·  (V61.07) (Z63.4)   ·  from 1989 to 2017 when her  passed away.     Allergies  Medication    · Penicillins   Recorded By: Jessica Felix; 1/16/2014 11:48:33 AM   ·  Tetracyclines   Recorded By: Jessica Felix; 1/16/2014 11:48:33 AM           Physical Exam     Vital signs as per nursing/MA documentation  General appearance: Alert and in no acute distress  HEENT: Normal Inspection  Neck - Normal Inspectiopn  Respiratory : No respiratory distress. Lungs are clear   Cardiovascular: heart rate normal. No gallop  Back - normal inspection  Skin inspection:Warm  Musculoskeletal : No deformities  Neuro : Limited exam. Baseline  Psychiatric : Cooperative               Electronically Signed By: Tereso Acharya MD   12/23/23  5:15 AM

## 2023-12-23 NOTE — PROGRESS NOTES
Provider Impression          Jonah     1. Abdominal pain -chronic  2. Chronic constipation. Continue with a bowel regimen. The patient does have bowel movements every day  3.Severe anxiety and depression managed by psych consultants. Patient is on number of psychotropic agents. Please see HPI for medication list  4. 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 Contributing to her constipation issues  5. Nutritional status is adequate with no concerns. Patient remains obese.  6. Progressive supranuclear ophthalmoplegia. Patient on valproic acid.  7. Skin remains intact without breakdown or decubiti  8. Hypothyroidism currently supplemented     This is a woman who has multiple medical problems including rather severe mental illness. This has resulted in significant self-care deficits. She requires assistance in all activities of daily living. We will work-up this abdominal pain and her complaints. Blood work and ultrasound imaging has been ordered. We will make further recommendations following review of these results.     1. medications are reviewed      2. Continue with rehabilitative, supportive, and or restorative care as ordered and as the patient tolerates     3. Laboratory evaluations will be monitored on an ongoing as needed basis     4. Medications have been cross-referenced with the patient's diagnoses list, and medications reductions have been considered and/or implemented.     5. Pharmacy recommendations are addressed on an ongoing as needed basis.     6. Controlled substances have been electronically scripted every 60 days for opiates and others of similar schedule, and every 6 months for sedative/hypnotics and others of similar schedule.     7. Nursing has been queried about any potential adverse events that need to be reported to me.    Salient information and adjustment of care plan pertaining to this individual patient interaction today are the following:      A. We will continue with restorative  and supportive care as the patient tolerates    B. Laboratory examinations will continue to be drawn on an ongoing as-needed basis. The patient's weight needs to be monitored and if needed we may need to institute appetite stimulating medication    C. The patient's long term prognosis is guarded    Charting is done using voice recognition software and may contain errors which may not have been completely corrected              History of Present Illness  ALT-unit #6     Progress note  DNR CCA        This is a  long-term resident.      chronic abd pain     Patient suffers from schizoaffective disorder, major depression, bipolar disorder, anxiety disorder, paranoia with psychosis. She has had ECT in the past.        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, generalized weakness, impaired mobility, thrombocytopenia, vitamin D deficiency, hypothyroidism     MEDICATION  Facility protocol meds as needed, vitamin B12 1000 mcg daily, nystatin ointment, omeprazole 20 mg daily, vitamin D3 1000 units daily, clonazepam 0.5 mg 3 tablets twice daily, Synthroid 50 mcg daily, Zofran 4 mg every 6 hours as needed, trazodone 100 mg at bedtime, Sinemet  mg 3 times daily, Biotene dry mouth moisturizer, Lipitor 20 mg daily, folic acid 1 mg daily, Effexor  mg daily, valproic acid 250 mg twice daily, ax, artificial tears, Pyridium 200 mg every 12 hours as needed, Estrace 0.1 mg/g insert 0.1 g vaginally at bedtime for 14 days, Abilify 15 mg daily, Celexa 10 mg daily     SOCIAL/FAMILY HISTORY  Reviewed as previously documented      Review of Systems  All system review as allowed by patient condition and nursing input is negative        Active Problems  Problems    · Dementia with parkinsonism (331.82,294.10)  (G20,F02.80)   · Depression with anxiety (300.4) (F41.8)   · Family history of breast cancer (V16.3) (Z80.3)   · Fibrocystic breast (610.1) (N60.19)   · Frontal lobe deficit (799.55) (R41.844)   · Memory loss (780.93) (R41.3)   · Neurocognitive deficits (781.99) (R29.818,R41.89)   · Parkinsonism (332.0) (G20)   · Progressive supranuclear palsy (333.0) (G23.1)   · Schizoaffective disorder, mixed type (295.70) (F25.0)     Past Medical History  Problems    · History of anxiety (V11.8) (Z86.59)   · History of hypothyroidism (V12.29) (Z86.39)   · History of schizoaffective disorder (V11.0) (Z86.59)   · History of vitamin D deficiency (V12.1) (Z86.39)   · History of Polyp of vagina (623.7) (N84.2)   · History of Recurrent major depressive disorder, remission status unspecified (296.30)  (F33.9)   · History of Schizoaffective disorder, bipolar type without good prognostic features (295.70)  (F25.0)     Family History  Mother    · Family history of depression (V17.0) (Z81.8)  Father    · Family history of depression (V17.0) (Z81.8)  Sister    · Family history of breast cancer (V16.3) (Z80.3)  Brother    · Family history of mental disorder (V17.0) (Z81.8)  Maternal Aunt    · Family history of breast cancer (V16.3) (Z80.3)     Social History  Problems    · Born in [place]   · Completed college, bachelors degree   · General Sciences.   · Completed elementary school   · Completed graduate school, masters degree   · Criminal Justice Degree   · Completed high school   · Former smoker (V15.82) (Z87.891)   · Has no children   · Retired from employment   · Was a [employer]  from 1992 to 2009. Before that was [employer]      .   ·  (V61.07) (Z63.4)   ·  from 1989 to 2017 when her  passed away.     Allergies  Medication    · Penicillins   Recorded By: Jessica Felix; 1/16/2014 11:48:33 AM   · Tetracyclines   Recorded By: Jessica Felix; 1/16/2014 11:48:33 AM           Physical  Exam     Vital signs as per nursing/MA documentation  General appearance: Alert and in no acute distress  HEENT: Normal Inspection  Neck - Normal Inspectiopn  Respiratory : No respiratory distress. Lungs are clear   Cardiovascular: heart rate normal. No gallop  Back - normal inspection  Skin inspection:Warm  Musculoskeletal : No deformities  Neuro : Limited exam. Baseline  Psychiatric : Cooperative

## 2024-01-25 ENCOUNTER — NURSING HOME VISIT (OUTPATIENT)
Dept: POST ACUTE CARE | Facility: EXTERNAL LOCATION | Age: 69
End: 2024-01-25
Payer: MEDICARE

## 2024-01-25 DIAGNOSIS — F31.9 BIPOLAR AFFECTIVE DISORDER, REMISSION STATUS UNSPECIFIED (MULTI): Primary | ICD-10-CM

## 2024-01-25 DIAGNOSIS — F25.9 SCHIZOAFFECTIVE DISORDER, UNSPECIFIED TYPE (MULTI): ICD-10-CM

## 2024-01-25 DIAGNOSIS — F29 ATYPICAL PSYCHOSIS (MULTI): ICD-10-CM

## 2024-01-25 DIAGNOSIS — E46 PROTEIN-CALORIE MALNUTRITION, UNSPECIFIED SEVERITY (MULTI): ICD-10-CM

## 2024-01-25 PROCEDURE — 99309 SBSQ NF CARE MODERATE MDM 30: CPT | Performed by: EMERGENCY MEDICINE

## 2024-01-25 NOTE — LETTER
Patient: Daysi John  : 1955    Encounter Date: 2024     Provider Impression          Jonah     1. Abdominal pain -chronic  2. Chronic constipation. Continue with a bowel regimen. The patient does have bowel movements every day  3.Severe anxiety and depression managed by psych consultants. Patient is on number of psychotropic agents. Please see HPI for medication list  4. 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 Contributing to her constipation issues  5. Nutritional status is adequate with no concerns. Patient remains obese.  6. Progressive supranuclear ophthalmoplegia. Patient on valproic acid.  7. Skin remains intact without breakdown or decubiti  8. Hypothyroidism currently supplemented     This is a woman who has multiple medical problems including rather severe mental illness. This has resulted in significant self-care deficits. She requires assistance in all activities of daily living. We will work-up this abdominal pain and her complaints. Blood work and ultrasound imaging has been ordered. We will make further recommendations following review of these results.     -Fall prevention    -Cognitive engagement     -Monitor and treat blood pressure     -Aggressive decubitus ulcer prevention.     -Bowel and bladder care     -Optimal nutrition and supplementation as needed     -GI  and DVT prophylaxis     -Symptom control     -Ambulation as tolerated     -Will follow    Charting is done using voice recognition software and may contain errors which have not been completely corrected            History of Present Illness  ALT-unit #6     Progress note  DNR CCA        This is a  long-term resident.      chronic abd pain     Patient suffers from schizoaffective disorder, major depression, bipolar disorder, anxiety disorder, paranoia with psychosis. She has had ECT in the past.        [facility]  Schizoaffective disorder, major depression, bipolar disorder, anxiety disorder, paranoia with psychosis having  ECT therapy remotely, progressive supranuclear ophthalmoplegia, frequent falls, protein calorie malnutrition, self-care deficits, mixed a phase he, generalized weakness, impaired mobility, thrombocytopenia, vitamin D deficiency, hypothyroidism     MEDICATION  Facility protocol meds as needed, vitamin B12 1000 mcg daily, nystatin ointment, omeprazole 20 mg daily, vitamin D3 1000 units daily, clonazepam 0.5 mg 3 tablets twice daily, Synthroid 50 mcg daily, Zofran 4 mg every 6 hours as needed, trazodone 100 mg at bedtime, Sinemet  mg 3 times daily, Biotene dry mouth moisturizer, Lipitor 20 mg daily, folic acid 1 mg daily, Effexor  mg daily, valproic acid 250 mg twice daily, ax, artificial tears, Pyridium 200 mg every 12 hours as needed, Estrace 0.1 mg/g insert 0.1 g vaginally at bedtime for 14 days, Abilify 15 mg daily, Celexa 10 mg daily     SOCIAL/FAMILY HISTORY  Reviewed as previously documented      Review of Systems  All system review as allowed by patient condition and nursing input is negative        Active Problems  Problems    · Dementia with parkinsonism (331.82,294.10) (G20,F02.80)   · Depression with anxiety (300.4) (F41.8)   · Family history of breast cancer (V16.3) (Z80.3)   · Fibrocystic breast (610.1) (N60.19)   · Frontal lobe deficit (799.55) (R41.844)   · Memory loss (780.93) (R41.3)   · Neurocognitive deficits (781.99) (R29.818,R41.89)   · Parkinsonism (332.0) (G20)   · Progressive supranuclear palsy (333.0) (G23.1)   · Schizoaffective disorder, mixed type (295.70) (F25.0)     Past Medical History  Problems    · History of anxiety (V11.8) (Z86.59)   · History of hypothyroidism (V12.29) (Z86.39)   · History of schizoaffective disorder (V11.0) (Z86.59)   · History of vitamin D deficiency (V12.1) (Z86.39)   · History of Polyp of vagina (623.7) (N84.2)   · History of Recurrent major depressive disorder, remission status unspecified (296.30)  (F33.9)   · History of Schizoaffective disorder,  bipolar type without good prognostic features (295.70)  (F25.0)     Family History  Mother    · Family history of depression (V17.0) (Z81.8)  Father    · Family history of depression (V17.0) (Z81.8)  Sister    · Family history of breast cancer (V16.3) (Z80.3)  Brother    · Family history of mental disorder (V17.0) (Z81.8)  Maternal Aunt    · Family history of breast cancer (V16.3) (Z80.3)     Social History  Problems    · Born in [place]   · Completed college, bachelors degree   · General Sciences.   · Completed elementary school   · Completed graduate school, masters degree   · Criminal Justice Degree   · Completed high school   · Former smoker (V15.82) (Z87.891)   · Has no children   · Retired from employment   · Was a [employer]  from 1992 to 2009. Before that was [employer]      .   ·  (V61.07) (Z63.4)   ·  from 1989 to 2017 when her  passed away.     Allergies  Medication    · Penicillins   Recorded By: Jessica Felix; 1/16/2014 11:48:33 AM   · Tetracyclines   Recorded By: Jessica Felix; 1/16/2014 11:48:33 AM           Physical Exam     Vital signs as per nursing/MA documentation  General appearance: Alert and in no acute distress  HEENT: Normal Inspection  Neck - Normal Inspectiopn  Respiratory : No respiratory distress. Lungs are clear   Cardiovascular: heart rate normal. No gallop  Back - normal inspection  Skin inspection:Warm  Musculoskeletal : No deformities  Neuro : Limited exam. Baseline  Psychiatric : Cooperative               Electronically Signed By: Tereso Acharya MD   2/4/24  9:32 AM

## 2024-02-04 NOTE — PROGRESS NOTES
Provider Impression          Jonah     1. Abdominal pain -chronic  2. Chronic constipation. Continue with a bowel regimen. The patient does have bowel movements every day  3.Severe anxiety and depression managed by psych consultants. Patient is on number of psychotropic agents. Please see HPI for medication list  4. 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 Contributing to her constipation issues  5. Nutritional status is adequate with no concerns. Patient remains obese.  6. Progressive supranuclear ophthalmoplegia. Patient on valproic acid.  7. Skin remains intact without breakdown or decubiti  8. Hypothyroidism currently supplemented     This is a woman who has multiple medical problems including rather severe mental illness. This has resulted in significant self-care deficits. She requires assistance in all activities of daily living. We will work-up this abdominal pain and her complaints. Blood work and ultrasound imaging has been ordered. We will make further recommendations following review of these results.     -Fall prevention    -Cognitive engagement     -Monitor and treat blood pressure     -Aggressive decubitus ulcer prevention.     -Bowel and bladder care     -Optimal nutrition and supplementation as needed     -GI  and DVT prophylaxis     -Symptom control     -Ambulation as tolerated     -Will follow    Charting is done using voice recognition software and may contain errors which have not been completely corrected            History of Present Illness  ALT-unit #6     Progress note  DNR CCA        This is a  long-term resident.      chronic abd pain     Patient suffers from schizoaffective disorder, major depression, bipolar disorder, anxiety disorder, paranoia with psychosis. She has had ECT in the past.        [facility]  Schizoaffective disorder, major depression, bipolar disorder, anxiety disorder, paranoia with psychosis having ECT therapy remotely, progressive supranuclear ophthalmoplegia,  frequent falls, protein calorie malnutrition, self-care deficits, mixed a phase he, generalized weakness, impaired mobility, thrombocytopenia, vitamin D deficiency, hypothyroidism     MEDICATION  Facility protocol meds as needed, vitamin B12 1000 mcg daily, nystatin ointment, omeprazole 20 mg daily, vitamin D3 1000 units daily, clonazepam 0.5 mg 3 tablets twice daily, Synthroid 50 mcg daily, Zofran 4 mg every 6 hours as needed, trazodone 100 mg at bedtime, Sinemet  mg 3 times daily, Biotene dry mouth moisturizer, Lipitor 20 mg daily, folic acid 1 mg daily, Effexor  mg daily, valproic acid 250 mg twice daily, ax, artificial tears, Pyridium 200 mg every 12 hours as needed, Estrace 0.1 mg/g insert 0.1 g vaginally at bedtime for 14 days, Abilify 15 mg daily, Celexa 10 mg daily     SOCIAL/FAMILY HISTORY  Reviewed as previously documented      Review of Systems  All system review as allowed by patient condition and nursing input is negative        Active Problems  Problems    · Dementia with parkinsonism (331.82,294.10) (G20,F02.80)   · Depression with anxiety (300.4) (F41.8)   · Family history of breast cancer (V16.3) (Z80.3)   · Fibrocystic breast (610.1) (N60.19)   · Frontal lobe deficit (799.55) (R41.844)   · Memory loss (780.93) (R41.3)   · Neurocognitive deficits (781.99) (R29.818,R41.89)   · Parkinsonism (332.0) (G20)   · Progressive supranuclear palsy (333.0) (G23.1)   · Schizoaffective disorder, mixed type (295.70) (F25.0)     Past Medical History  Problems    · History of anxiety (V11.8) (Z86.59)   · History of hypothyroidism (V12.29) (Z86.39)   · History of schizoaffective disorder (V11.0) (Z86.59)   · History of vitamin D deficiency (V12.1) (Z86.39)   · History of Polyp of vagina (623.7) (N84.2)   · History of Recurrent major depressive disorder, remission status unspecified (296.30)  (F33.9)   · History of Schizoaffective disorder, bipolar type without good prognostic features (295.70)  (F25.0)      Family History  Mother    · Family history of depression (V17.0) (Z81.8)  Father    · Family history of depression (V17.0) (Z81.8)  Sister    · Family history of breast cancer (V16.3) (Z80.3)  Brother    · Family history of mental disorder (V17.0) (Z81.8)  Maternal Aunt    · Family history of breast cancer (V16.3) (Z80.3)     Social History  Problems    · Born in [place]   · Completed college, bachelors degree   · General Sciences.   · Completed elementary school   · Completed graduate school, masters degree   · Criminal Justice Degree   · Completed high school   · Former smoker (V15.82) (Z87.891)   · Has no children   · Retired from employment   · Was a [employer]  from 1992 to 2009. Before that was [employer]      .   ·  (V61.07) (Z63.4)   ·  from 1989 to 2017 when her  passed away.     Allergies  Medication    · Penicillins   Recorded By: Jessica Felix; 1/16/2014 11:48:33 AM   · Tetracyclines   Recorded By: Jessica Felix; 1/16/2014 11:48:33 AM           Physical Exam     Vital signs as per nursing/MA documentation  General appearance: Alert and in no acute distress  HEENT: Normal Inspection  Neck - Normal Inspectiopn  Respiratory : No respiratory distress. Lungs are clear   Cardiovascular: heart rate normal. No gallop  Back - normal inspection  Skin inspection:Warm  Musculoskeletal : No deformities  Neuro : Limited exam. Baseline  Psychiatric : Cooperative

## 2024-02-14 ENCOUNTER — NURSING HOME VISIT (OUTPATIENT)
Dept: POST ACUTE CARE | Facility: EXTERNAL LOCATION | Age: 69
End: 2024-02-14
Payer: MEDICARE

## 2024-02-14 DIAGNOSIS — F29 ATYPICAL PSYCHOSIS (MULTI): ICD-10-CM

## 2024-02-14 DIAGNOSIS — F31.9 BIPOLAR AFFECTIVE DISORDER, REMISSION STATUS UNSPECIFIED (MULTI): ICD-10-CM

## 2024-02-14 DIAGNOSIS — E46 PROTEIN-CALORIE MALNUTRITION, UNSPECIFIED SEVERITY (MULTI): ICD-10-CM

## 2024-02-14 DIAGNOSIS — E03.9 HYPOTHYROIDISM, UNSPECIFIED TYPE: Primary | ICD-10-CM

## 2024-02-14 DIAGNOSIS — F25.9 SCHIZOAFFECTIVE DISORDER, UNSPECIFIED TYPE (MULTI): ICD-10-CM

## 2024-02-14 PROCEDURE — 99308 SBSQ NF CARE LOW MDM 20: CPT | Performed by: EMERGENCY MEDICINE

## 2024-02-14 NOTE — LETTER
Patient: Daysi John  : 1955    Encounter Date: 2024    Provider Impression           Jonah     1. Abdominal pain -chronic  2. Chronic constipation. Continue with a bowel regimen. The patient does have bowel movements every day  3.Severe anxiety and depression managed by psych consultants. Patient is on number of psychotropic agents. Please see HPI for medication list  4. 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 Contributing to her constipation issues  5. Nutritional status is adequate with no concerns. Patient remains obese.  6. Progressive supranuclear ophthalmoplegia. Patient on valproic acid.  7. Skin remains intact without breakdown or decubiti  8. Hypothyroidism currently supplemented     This is a woman who has multiple medical problems including rather severe mental illness. This has resulted in significant self-care deficits. She requires assistance in all activities of daily living. We will work-up this abdominal pain and her complaints. Blood work and ultrasound imaging has been ordered. We will make further recommendations following review of these results.     Provide safe environment for the patient     Continue current medication regimen     OT PT and speech therapy     Bowel and bladder,skin care     Nutritional support     monitor and treat blood glucose     GI  and DVT prophylaxis     PRN medications     Periodic lab work    Regular Follow up    Charting is done using voice recognition software and may contain errors which have not been completely corrected             History of Present Illness  ALT-unit #6     Progress note  DNR CCA        This is a  long-term resident.      chronic abd pain     Patient suffers from schizoaffective disorder, major depression, bipolar disorder, anxiety disorder, paranoia with psychosis. She has had ECT in the past.        [facility]  Schizoaffective disorder, major depression, bipolar disorder, anxiety disorder, paranoia with psychosis having ECT  therapy remotely, progressive supranuclear ophthalmoplegia, frequent falls, protein calorie malnutrition, self-care deficits, mixed a phase he, generalized weakness, impaired mobility, thrombocytopenia, vitamin D deficiency, hypothyroidism     MEDICATION  Facility protocol meds as needed, vitamin B12 1000 mcg daily, nystatin ointment, omeprazole 20 mg daily, vitamin D3 1000 units daily, clonazepam 0.5 mg 3 tablets twice daily, Synthroid 50 mcg daily, Zofran 4 mg every 6 hours as needed, trazodone 100 mg at bedtime, Sinemet  mg 3 times daily, Biotene dry mouth moisturizer, Lipitor 20 mg daily, folic acid 1 mg daily, Effexor  mg daily, valproic acid 250 mg twice daily, ax, artificial tears, Pyridium 200 mg every 12 hours as needed, Estrace 0.1 mg/g insert 0.1 g vaginally at bedtime for 14 days, Abilify 15 mg daily, Celexa 10 mg daily     SOCIAL/FAMILY HISTORY  Reviewed as previously documented      Review of Systems  All system review as allowed by patient condition and nursing input is negative        Active Problems  Problems    · Dementia with parkinsonism (331.82,294.10) (G20,F02.80)   · Depression with anxiety (300.4) (F41.8)   · Family history of breast cancer (V16.3) (Z80.3)   · Fibrocystic breast (610.1) (N60.19)   · Frontal lobe deficit (799.55) (R41.844)   · Memory loss (780.93) (R41.3)   · Neurocognitive deficits (781.99) (R29.818,R41.89)   · Parkinsonism (332.0) (G20)   · Progressive supranuclear palsy (333.0) (G23.1)   · Schizoaffective disorder, mixed type (295.70) (F25.0)     Past Medical History  Problems    · History of anxiety (V11.8) (Z86.59)   · History of hypothyroidism (V12.29) (Z86.39)   · History of schizoaffective disorder (V11.0) (Z86.59)   · History of vitamin D deficiency (V12.1) (Z86.39)   · History of Polyp of vagina (623.7) (N84.2)   · History of Recurrent major depressive disorder, remission status unspecified (296.30)  (F33.9)   · History of Schizoaffective disorder,  bipolar type without good prognostic features (295.70)  (F25.0)     Family History  Mother    · Family history of depression (V17.0) (Z81.8)  Father    · Family history of depression (V17.0) (Z81.8)  Sister    · Family history of breast cancer (V16.3) (Z80.3)  Brother    · Family history of mental disorder (V17.0) (Z81.8)  Maternal Aunt    · Family history of breast cancer (V16.3) (Z80.3)     Social History  Problems    · Born in [place]   · Completed college, bachelors degree   · General Sciences.   · Completed elementary school   · Completed graduate school, masters degree   · Criminal Justice Degree   · Completed high school   · Former smoker (V15.82) (Z87.891)   · Has no children   · Retired from employment   · Was a [employer] [place]  from 1992 to 2009. Before that was [employer]      .   ·  (V61.07) (Z63.4)   ·  from 1989 to 2017 when her  passed away.     Allergies  Medication    · Penicillins   Recorded By: Jessica Felix; 1/16/2014 11:48:33 AM   · Tetracyclines   Recorded By: Jessica Felix; 1/16/2014 11:48:33 AM           Physical Exam     Vital signs as per nursing/MA documentation  General appearance: Alert and in no acute distress  HEENT: Normal Inspection  Neck - Normal Inspectiopn  Respiratory : No respiratory distress. Lungs are clear   Cardiovascular: heart rate normal. No gallop  Back - normal inspection  Skin inspection:Warm  Musculoskeletal : No deformities  Neuro : Limited exam. Baseline  Psychiatric : Cooperative      Electronically Signed By: Tereso Acharya MD   3/5/24  4:28 PM

## 2024-03-04 NOTE — PROGRESS NOTES
Provider Impression           Jonah     1. Abdominal pain -chronic  2. Chronic constipation. Continue with a bowel regimen. The patient does have bowel movements every day  3.Severe anxiety and depression managed by psych consultants. Patient is on number of psychotropic agents. Please see HPI for medication list  4. 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 Contributing to her constipation issues  5. Nutritional status is adequate with no concerns. Patient remains obese.  6. Progressive supranuclear ophthalmoplegia. Patient on valproic acid.  7. Skin remains intact without breakdown or decubiti  8. Hypothyroidism currently supplemented     This is a woman who has multiple medical problems including rather severe mental illness. This has resulted in significant self-care deficits. She requires assistance in all activities of daily living. We will work-up this abdominal pain and her complaints. Blood work and ultrasound imaging has been ordered. We will make further recommendations following review of these results.     Provide safe environment for the patient     Continue current medication regimen     OT PT and speech therapy     Bowel and bladder,skin care     Nutritional support     monitor and treat blood glucose     GI  and DVT prophylaxis     PRN medications     Periodic lab work    Regular Follow up    Charting is done using voice recognition software and may contain errors which have not been completely corrected             History of Present Illness  ALT-unit #6     Progress note  DNR CCA        This is a  long-term resident.      chronic abd pain     Patient suffers from schizoaffective disorder, major depression, bipolar disorder, anxiety disorder, paranoia with psychosis. She has had ECT in the past.        [facility]  Schizoaffective disorder, major depression, bipolar disorder, anxiety disorder, paranoia with psychosis having ECT therapy remotely, progressive supranuclear ophthalmoplegia, frequent  falls, protein calorie malnutrition, self-care deficits, mixed a phase he, generalized weakness, impaired mobility, thrombocytopenia, vitamin D deficiency, hypothyroidism     MEDICATION  Facility protocol meds as needed, vitamin B12 1000 mcg daily, nystatin ointment, omeprazole 20 mg daily, vitamin D3 1000 units daily, clonazepam 0.5 mg 3 tablets twice daily, Synthroid 50 mcg daily, Zofran 4 mg every 6 hours as needed, trazodone 100 mg at bedtime, Sinemet  mg 3 times daily, Biotene dry mouth moisturizer, Lipitor 20 mg daily, folic acid 1 mg daily, Effexor  mg daily, valproic acid 250 mg twice daily, ax, artificial tears, Pyridium 200 mg every 12 hours as needed, Estrace 0.1 mg/g insert 0.1 g vaginally at bedtime for 14 days, Abilify 15 mg daily, Celexa 10 mg daily     SOCIAL/FAMILY HISTORY  Reviewed as previously documented      Review of Systems  All system review as allowed by patient condition and nursing input is negative        Active Problems  Problems    · Dementia with parkinsonism (331.82,294.10) (G20,F02.80)   · Depression with anxiety (300.4) (F41.8)   · Family history of breast cancer (V16.3) (Z80.3)   · Fibrocystic breast (610.1) (N60.19)   · Frontal lobe deficit (799.55) (R41.844)   · Memory loss (780.93) (R41.3)   · Neurocognitive deficits (781.99) (R29.818,R41.89)   · Parkinsonism (332.0) (G20)   · Progressive supranuclear palsy (333.0) (G23.1)   · Schizoaffective disorder, mixed type (295.70) (F25.0)     Past Medical History  Problems    · History of anxiety (V11.8) (Z86.59)   · History of hypothyroidism (V12.29) (Z86.39)   · History of schizoaffective disorder (V11.0) (Z86.59)   · History of vitamin D deficiency (V12.1) (Z86.39)   · History of Polyp of vagina (623.7) (N84.2)   · History of Recurrent major depressive disorder, remission status unspecified (296.30)  (F33.9)   · History of Schizoaffective disorder, bipolar type without good prognostic features (295.70)  (F25.0)     Family  History  Mother    · Family history of depression (V17.0) (Z81.8)  Father    · Family history of depression (V17.0) (Z81.8)  Sister    · Family history of breast cancer (V16.3) (Z80.3)  Brother    · Family history of mental disorder (V17.0) (Z81.8)  Maternal Aunt    · Family history of breast cancer (V16.3) (Z80.3)     Social History  Problems    · Born in [place]   · Completed college, bachelors degree   · General Sciences.   · Completed elementary school   · Completed graduate school, masters degree   · Criminal Justice Degree   · Completed high school   · Former smoker (V15.82) (Z87.891)   · Has no children   · Retired from employment   · Was a [employer]  from 1992 to 2009. Before that was [employer]      .   ·  (V61.07) (Z63.4)   ·  from 1989 to 2017 when her  passed away.     Allergies  Medication    · Penicillins   Recorded By: Jessica Felix; 1/16/2014 11:48:33 AM   · Tetracyclines   Recorded By: Jessica Felix; 1/16/2014 11:48:33 AM           Physical Exam     Vital signs as per nursing/MA documentation  General appearance: Alert and in no acute distress  HEENT: Normal Inspection  Neck - Normal Inspectiopn  Respiratory : No respiratory distress. Lungs are clear   Cardiovascular: heart rate normal. No gallop  Back - normal inspection  Skin inspection:Warm  Musculoskeletal : No deformities  Neuro : Limited exam. Baseline  Psychiatric : Cooperative

## 2024-03-21 ENCOUNTER — NURSING HOME VISIT (OUTPATIENT)
Dept: POST ACUTE CARE | Facility: EXTERNAL LOCATION | Age: 69
End: 2024-03-21
Payer: MEDICARE

## 2024-03-21 DIAGNOSIS — E46 PROTEIN-CALORIE MALNUTRITION, UNSPECIFIED SEVERITY (MULTI): ICD-10-CM

## 2024-03-21 DIAGNOSIS — F29 ATYPICAL PSYCHOSIS (MULTI): Primary | ICD-10-CM

## 2024-03-21 DIAGNOSIS — E03.9 HYPOTHYROIDISM, UNSPECIFIED TYPE: ICD-10-CM

## 2024-03-21 DIAGNOSIS — F25.9 SCHIZOAFFECTIVE DISORDER, UNSPECIFIED TYPE (MULTI): ICD-10-CM

## 2024-03-21 PROCEDURE — 99309 SBSQ NF CARE MODERATE MDM 30: CPT | Performed by: EMERGENCY MEDICINE

## 2024-03-21 NOTE — LETTER
Patient: Daysi John  : 1955    Encounter Date: 2024    Provider Impression           Jonah     1. Abdominal pain -chronic  2. Chronic constipation. Continue with a bowel regimen. The patient does have bowel movements every day  3.Severe anxiety and depression managed by psych consultants. Patient is on number of psychotropic agents. Please see HPI for medication list  4. 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 Contributing to her constipation issues  5. Nutritional status is adequate with no concerns. Patient remains obese.  6. Progressive supranuclear ophthalmoplegia. Patient on valproic acid.  7. Skin remains intact without breakdown or decubiti  8. Hypothyroidism currently supplemented     This is a woman who has multiple medical problems including rather severe mental illness. This has resulted in significant self-care deficits. She requires assistance in all activities of daily living. We will work-up this abdominal pain and her complaints. Blood work and ultrasound imaging has been ordered. We will make further recommendations following review of these results.     Rx list reviewed.   PT and OT evaluation is in the process.   Routine safety measures, fall precautions, risk modification and alarm placement if needed for prevention of falls.   Skin care precautions, prevention of pressures sores at pressure points assessed.   Pt needs to be monitored frequently by nursing staff particularly at night time.   Any confusion, agitation or behavioural disturbance needs to be attended, as per home policy   rapid covid Ag assay need to be done, notify if positive.   If needed appropriate measures to be taken for alarm placements and assisted devices, pt was told not to get up and ambulate at night unless help and assist available at bedside,   labs will be done as per our routine protocol.   PO intake need to be monitored if consuming po.       Charting is done using voice recognition software  and may contain errors which have not been completely corrected             History of Present Illness       Progress note  DNR CCA        This is a  long-term resident.      chronic abd pain     Patient suffers from schizoaffective disorder, major depression, bipolar disorder, anxiety disorder, paranoia with psychosis. She has had ECT in the past.        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, generalized weakness, impaired mobility, thrombocytopenia, vitamin D deficiency, hypothyroidism     MEDICATION  Facility protocol meds as needed, vitamin B12 1000 mcg daily, nystatin ointment, omeprazole 20 mg daily, vitamin D3 1000 units daily, clonazepam 0.5 mg 3 tablets twice daily, Synthroid 50 mcg daily, Zofran 4 mg every 6 hours as needed, trazodone 100 mg at bedtime, Sinemet  mg 3 times daily, Biotene dry mouth moisturizer, Lipitor 20 mg daily, folic acid 1 mg daily, Effexor  mg daily, valproic acid 250 mg twice daily, ax, artificial tears, Pyridium 200 mg every 12 hours as needed, Estrace 0.1 mg/g insert 0.1 g vaginally at bedtime for 14 days, Abilify 15 mg daily, Celexa 10 mg daily     SOCIAL/FAMILY HISTORY  Reviewed as previously documented      Review of Systems  All system review as allowed by patient condition and nursing input is negative        Active Problems  Problems    · Dementia with parkinsonism (331.82,294.10) (G20,F02.80)   · Depression with anxiety (300.4) (F41.8)   · Family history of breast cancer (V16.3) (Z80.3)   · Fibrocystic breast (610.1) (N60.19)   · Frontal lobe deficit (799.55) (R41.844)   · Memory loss (780.93) (R41.3)   · Neurocognitive deficits (781.99) (R29.818,R41.89)   · Parkinsonism (332.0) (G20)   · Progressive supranuclear palsy (333.0) (G23.1)   ·  Schizoaffective disorder, mixed type (295.70) (F25.0)     Past Medical History  Problems    · History of anxiety (V11.8) (Z86.59)   · History of hypothyroidism (V12.29) (Z86.39)   · History of schizoaffective disorder (V11.0) (Z86.59)   · History of vitamin D deficiency (V12.1) (Z86.39)   · History of Polyp of vagina (623.7) (N84.2)   · History of Recurrent major depressive disorder, remission status unspecified (296.30)  (F33.9)   · History of Schizoaffective disorder, bipolar type without good prognostic features (295.70)  (F25.0)     Family History  Mother    · Family history of depression (V17.0) (Z81.8)  Father    · Family history of depression (V17.0) (Z81.8)  Sister    · Family history of breast cancer (V16.3) (Z80.3)  Brother    · Family history of mental disorder (V17.0) (Z81.8)  Maternal Aunt    · Family history of breast cancer (V16.3) (Z80.3)     Social History  Problems    · Born in [place]   · Completed college, bachelors degree   · General Sciences.   · Completed elementary school   · Completed graduate school, masters degree   · Criminal Justice Degree   · Completed high school   · Former smoker (V15.82) (Z87.891)   · Has no children   · Retired from employment   · Was a [place]  from 1992 to 2009. Before that was [employer]      .   ·  (V61.07) (Z63.4)   ·  from 1989 to 2017 when her  passed away.     Allergies  Medication    · Penicillins   Recorded By: Jessica Felix; 1/16/2014 11:48:33 AM   · Tetracyclines   Recorded By: Jessica Felix; 1/16/2014 11:48:33 AM           Physical Exam     Vital signs as per nursing/MA documentation  General appearance: Alert and in no acute distress  HEENT: Normal Inspection  Neck - Normal Inspectiopn  Respiratory : No respiratory distress. Lungs are clear   Cardiovascular: heart rate normal. No gallop  Back - normal inspection  Skin inspection:Warm  Musculoskeletal : No deformities  Neuro : Limited exam.  Baseline  Psychiatric : Cooperative      Electronically Signed By: Tereso Acharya MD   3/30/24  5:34 PM

## 2024-04-18 ENCOUNTER — NURSING HOME VISIT (OUTPATIENT)
Dept: POST ACUTE CARE | Facility: EXTERNAL LOCATION | Age: 69
End: 2024-04-18
Payer: MEDICARE

## 2024-04-18 DIAGNOSIS — E46 PROTEIN-CALORIE MALNUTRITION, UNSPECIFIED SEVERITY (MULTI): ICD-10-CM

## 2024-04-18 DIAGNOSIS — F25.9 SCHIZOAFFECTIVE DISORDER, UNSPECIFIED TYPE (MULTI): ICD-10-CM

## 2024-04-18 DIAGNOSIS — F31.9 BIPOLAR AFFECTIVE DISORDER, REMISSION STATUS UNSPECIFIED (MULTI): ICD-10-CM

## 2024-04-18 DIAGNOSIS — E03.9 HYPOTHYROIDISM, UNSPECIFIED TYPE: Primary | ICD-10-CM

## 2024-04-18 PROCEDURE — 99308 SBSQ NF CARE LOW MDM 20: CPT | Performed by: EMERGENCY MEDICINE

## 2024-04-18 NOTE — LETTER
Patient: Daysi John  : 1955    Encounter Date: 2024    Provider Impression           Jonah     1. Abdominal pain -chronic  2. Chronic constipation. Continue with a bowel regimen. The patient does have bowel movements every day  3.Severe anxiety and depression managed by psych consultants. Patient is on number of psychotropic agents. Please see HPI for medication list  4. 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 Contributing to her constipation issues  5. Nutritional status is adequate with no concerns. Patient remains obese.  6. Progressive supranuclear ophthalmoplegia. Patient on valproic acid.  7. Skin remains intact without breakdown or decubiti  8. Hypothyroidism currently supplemented     This is a woman who has multiple medical problems including rather severe mental illness. This has resulted in significant self-care deficits. She requires assistance in all activities of daily living. We will work-up this abdominal pain and her complaints. Blood work and ultrasound imaging has been ordered. We will make further recommendations following review of these results.     1. medications are reviewed      2. Continue with rehabilitative, supportive, and or restorative care as ordered and as the patient tolerates     3. Laboratory evaluations will be monitored on an ongoing as needed basis     4. Medications have been cross-referenced with the patient's diagnoses list, and medications reductions have been considered and/or implemented.     5. Pharmacy recommendations are addressed on an ongoing as needed basis.     6. Controlled substances have been electronically scripted every 60 days for opiates and others of similar schedule, and every 6 months for sedative/hypnotics and others of similar schedule.     7. Nursing has been queried about any potential adverse events that need to be reported to me.    Salient information and adjustment of care plan pertaining to this individual patient  interaction today are the following:      A. We will continue with restorative and supportive care as the patient tolerates    B. Laboratory examinations will continue to be drawn on an ongoing as-needed basis. The patient's weight needs to be monitored and if needed we may need to institute appetite stimulating medication    C. The patient's long term prognosis is guarded    Charting is done using voice recognition software and may contain errors which may not have been completely corrected             History of Present Illness       Progress note  DNR [facility]        This is a  long-term resident.      chronic abd pain     Patient suffers from schizoaffective disorder, major depression, bipolar disorder, anxiety disorder, paranoia with psychosis. She has had ECT in the past.        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, generalized weakness, impaired mobility, thrombocytopenia, vitamin D deficiency, hypothyroidism     MEDICATION  Facility protocol meds as needed, vitamin B12 1000 mcg daily, nystatin ointment, omeprazole 20 mg daily, vitamin D3 1000 units daily, clonazepam 0.5 mg 3 tablets twice daily, Synthroid 50 mcg daily, Zofran 4 mg every 6 hours as needed, trazodone 100 mg at bedtime, Sinemet  mg 3 times daily, Biotene dry mouth moisturizer, Lipitor 20 mg daily, folic acid 1 mg daily, Effexor  mg daily, valproic acid 250 mg twice daily, ax, artificial tears, Pyridium 200 mg every 12 hours as needed, Estrace 0.1 mg/g insert 0.1 g vaginally at bedtime for 14 days, Abilify 15 mg daily, Celexa 10 mg daily     SOCIAL/FAMILY HISTORY  Reviewed as previously documented      Review of Systems  All system review as allowed by patient condition and nursing input is negative        Active  Problems  Problems    · Dementia with parkinsonism (331.82,294.10) (G20,F02.80)   · Depression with anxiety (300.4) (F41.8)   · Family history of breast cancer (V16.3) (Z80.3)   · Fibrocystic breast (610.1) (N60.19)   · Frontal lobe deficit (799.55) (R41.844)   · Memory loss (780.93) (R41.3)   · Neurocognitive deficits (781.99) (R29.818,R41.89)   · Parkinsonism (332.0) (G20)   · Progressive supranuclear palsy (333.0) (G23.1)   · Schizoaffective disorder, mixed type (295.70) (F25.0)     Past Medical History  Problems    · History of anxiety (V11.8) (Z86.59)   · History of hypothyroidism (V12.29) (Z86.39)   · History of schizoaffective disorder (V11.0) (Z86.59)   · History of vitamin D deficiency (V12.1) (Z86.39)   · History of Polyp of vagina (623.7) (N84.2)   · History of Recurrent major depressive disorder, remission status unspecified (296.30)  (F33.9)   · History of Schizoaffective disorder, bipolar type without good prognostic features (295.70)  (F25.0)     Family History  Mother    · Family history of depression (V17.0) (Z81.8)  Father    · Family history of depression (V17.0) (Z81.8)  Sister    · Family history of breast cancer (V16.3) (Z80.3)  Brother    · Family history of mental disorder (V17.0) (Z81.8)  Maternal Aunt    · Family history of breast cancer (V16.3) (Z80.3)     Social History  Problems    · Born in [place]   · Completed college, bachelors degree   · General Sciences.   · Completed elementary school   · Completed graduate school, masters degree   · Criminal Justice Degree   · Completed high school   · Former smoker (V15.82) (Z87.891)   · Has no children   · Retired from employment   · Was a [employer]  from 1992 to 2009. Before that was [employer]      .   ·  (V61.07) (Z63.4)   ·  from 1989 to 2017 when her  passed away.     Allergies  Medication    · Penicillins   Recorded By: Jessica Felix; 1/16/2014 11:48:33 AM   · Tetracyclines    Recorded By: Jessica Felix; 1/16/2014 11:48:33 AM           Physical Exam     Vital signs as per nursing/MA documentation  General appearance: Alert and in no acute distress  HEENT: Normal Inspection  Neck - Normal Inspectiopn  Respiratory : No respiratory distress. Lungs are clear   Cardiovascular: heart rate normal. No gallop  Back - normal inspection  Skin inspection:Warm  Musculoskeletal : No deformities  Neuro : Limited exam. Baseline  Psychiatric : Cooperative      Electronically Signed By: Tereso Acharya MD   4/20/24  9:16 AM

## 2024-04-20 NOTE — PROGRESS NOTES
Provider Impression           Jonah     1. Abdominal pain -chronic  2. Chronic constipation. Continue with a bowel regimen. The patient does have bowel movements every day  3.Severe anxiety and depression managed by psych consultants. Patient is on number of psychotropic agents. Please see HPI for medication list  4. 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 Contributing to her constipation issues  5. Nutritional status is adequate with no concerns. Patient remains obese.  6. Progressive supranuclear ophthalmoplegia. Patient on valproic acid.  7. Skin remains intact without breakdown or decubiti  8. Hypothyroidism currently supplemented     This is a woman who has multiple medical problems including rather severe mental illness. This has resulted in significant self-care deficits. She requires assistance in all activities of daily living. We will work-up this abdominal pain and her complaints. Blood work and ultrasound imaging has been ordered. We will make further recommendations following review of these results.     1. medications are reviewed      2. Continue with rehabilitative, supportive, and or restorative care as ordered and as the patient tolerates     3. Laboratory evaluations will be monitored on an ongoing as needed basis     4. Medications have been cross-referenced with the patient's diagnoses list, and medications reductions have been considered and/or implemented.     5. Pharmacy recommendations are addressed on an ongoing as needed basis.     6. Controlled substances have been electronically scripted every 60 days for opiates and others of similar schedule, and every 6 months for sedative/hypnotics and others of similar schedule.     7. Nursing has been queried about any potential adverse events that need to be reported to me.    Salient information and adjustment of care plan pertaining to this individual patient interaction today are the following:      A. We will continue with restorative  and supportive care as the patient tolerates    B. Laboratory examinations will continue to be drawn on an ongoing as-needed basis. The patient's weight needs to be monitored and if needed we may need to institute appetite stimulating medication    C. The patient's long term prognosis is guarded    Charting is done using voice recognition software and may contain errors which may not have been completely corrected             History of Present Illness       Progress note  DNR [facility]        This is a  long-term resident.      chronic abd pain     Patient suffers from schizoaffective disorder, major depression, bipolar disorder, anxiety disorder, paranoia with psychosis. She has had ECT in the past.        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, generalized weakness, impaired mobility, thrombocytopenia, vitamin D deficiency, hypothyroidism     MEDICATION  Facility protocol meds as needed, vitamin B12 1000 mcg daily, nystatin ointment, omeprazole 20 mg daily, vitamin D3 1000 units daily, clonazepam 0.5 mg 3 tablets twice daily, Synthroid 50 mcg daily, Zofran 4 mg every 6 hours as needed, trazodone 100 mg at bedtime, Sinemet  mg 3 times daily, Biotene dry mouth moisturizer, Lipitor 20 mg daily, folic acid 1 mg daily, Effexor  mg daily, valproic acid 250 mg twice daily, ax, artificial tears, Pyridium 200 mg every 12 hours as needed, Estrace 0.1 mg/g insert 0.1 g vaginally at bedtime for 14 days, Abilify 15 mg daily, Celexa 10 mg daily     SOCIAL/FAMILY HISTORY  Reviewed as previously documented      Review of Systems  All system review as allowed by patient condition and nursing input is negative        Active Problems  Problems    · Dementia with parkinsonism (331.82,294.10) (G20,F02.80)    · Depression with anxiety (300.4) (F41.8)   · Family history of breast cancer (V16.3) (Z80.3)   · Fibrocystic breast (610.1) (N60.19)   · Frontal lobe deficit (799.55) (R41.844)   · Memory loss (780.93) (R41.3)   · Neurocognitive deficits (781.99) (R29.818,R41.89)   · Parkinsonism (332.0) (G20)   · Progressive supranuclear palsy (333.0) (G23.1)   · Schizoaffective disorder, mixed type (295.70) (F25.0)     Past Medical History  Problems    · History of anxiety (V11.8) (Z86.59)   · History of hypothyroidism (V12.29) (Z86.39)   · History of schizoaffective disorder (V11.0) (Z86.59)   · History of vitamin D deficiency (V12.1) (Z86.39)   · History of Polyp of vagina (623.7) (N84.2)   · History of Recurrent major depressive disorder, remission status unspecified (296.30)  (F33.9)   · History of Schizoaffective disorder, bipolar type without good prognostic features (295.70)  (F25.0)     Family History  Mother    · Family history of depression (V17.0) (Z81.8)  Father    · Family history of depression (V17.0) (Z81.8)  Sister    · Family history of breast cancer (V16.3) (Z80.3)  Brother    · Family history of mental disorder (V17.0) (Z81.8)  Maternal Aunt    · Family history of breast cancer (V16.3) (Z80.3)     Social History  Problems    · Born in [place]   · Completed college, bachelors degree   · General Sciences.   · Completed elementary school   · Completed graduate school, masters degree   · Criminal Justice Degree   · Completed high school   · Former smoker (V15.82) (Z87.891)   · Has no children   · Retired from employment   · Was a [employer]  from 1992 to 2009. Before that was [employer]      .   ·  (V61.07) (Z63.4)   ·  from 1989 to 2017 when her  passed away.     Allergies  Medication    · Penicillins   Recorded By: Jessica Felix; 1/16/2014 11:48:33 AM   · Tetracyclines   Recorded By: Jessica Felix; 1/16/2014 11:48:33 AM           Physical Exam     Vital  signs as per nursing/MA documentation  General appearance: Alert and in no acute distress  HEENT: Normal Inspection  Neck - Normal Inspectiopn  Respiratory : No respiratory distress. Lungs are clear   Cardiovascular: heart rate normal. No gallop  Back - normal inspection  Skin inspection:Warm  Musculoskeletal : No deformities  Neuro : Limited exam. Baseline  Psychiatric : Cooperative

## 2024-05-02 ENCOUNTER — NURSING HOME VISIT (OUTPATIENT)
Dept: POST ACUTE CARE | Facility: EXTERNAL LOCATION | Age: 69
End: 2024-05-02
Payer: MEDICARE

## 2024-05-02 DIAGNOSIS — F29 ATYPICAL PSYCHOSIS (MULTI): Primary | ICD-10-CM

## 2024-05-02 DIAGNOSIS — F31.9 BIPOLAR AFFECTIVE DISORDER, REMISSION STATUS UNSPECIFIED (MULTI): ICD-10-CM

## 2024-05-02 DIAGNOSIS — E03.9 HYPOTHYROIDISM, UNSPECIFIED TYPE: ICD-10-CM

## 2024-05-02 DIAGNOSIS — F25.9 SCHIZOAFFECTIVE DISORDER, UNSPECIFIED TYPE (MULTI): ICD-10-CM

## 2024-05-02 PROCEDURE — 99309 SBSQ NF CARE MODERATE MDM 30: CPT | Performed by: EMERGENCY MEDICINE

## 2024-05-02 NOTE — LETTER
Patient: Daysi John  : 1955    Encounter Date: 2024    Provider Impression           Jonah     1. Abdominal pain -chronic  2. Chronic constipation. Continue with a bowel regimen. The patient does have bowel movements every day  3.Severe anxiety and depression managed by psych consultants. Patient is on number of psychotropic agents. Please see HPI for medication list  4. 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 Contributing to her constipation issues  5. Nutritional status is adequate with no concerns. Patient remains obese.  6. Progressive supranuclear ophthalmoplegia. Patient on valproic acid.  7. Skin remains intact without breakdown or decubiti  8. Hypothyroidism currently supplemented     This is a woman who has multiple medical problems including rather severe mental illness. This has resulted in significant self-care deficits. She requires assistance in all activities of daily living. We will work-up this abdominal pain and her complaints. Blood work and ultrasound imaging has been ordered. We will make further recommendations following review of these results.       This patient was seen for my regular monthly visit, nursing evaluations and nursing notes were reviewed, interim events are reviewed, interim concerns and messages were reviewed as we have communicated with nursing staff.  Any issues with the falls, skin care impairment, declining physical condition are reviewed and noted, diagnosis list were reviewed, list of medications were reviewed, living will related issues were reviewed, overall patient has been doing well, any declining in patient's condition or any change in patient's condition needs to be notified to physician promptly, discussed with nursing staff, if needed would communicate with family.  Patient stays confined here at the facility for long-term care, there are always concerns about long-term care related issues and concerns.  Nursing staff is trying their best  to keep patient safe, all sort of measures has been taken to keep patient safe and comfortable.                  History of Present Illness       Progress note  DNR [facility]        This is a  long-term resident.      chronic abd pain     Patient suffers from schizoaffective disorder, major depression, bipolar disorder, anxiety disorder, paranoia with psychosis. She has had ECT in the past.        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, generalized weakness, impaired mobility, thrombocytopenia, vitamin D deficiency, hypothyroidism     MEDICATION  Facility protocol meds as needed, vitamin B12 1000 mcg daily, nystatin ointment, omeprazole 20 mg daily, vitamin D3 1000 units daily, clonazepam 0.5 mg 3 tablets twice daily, Synthroid 50 mcg daily, Zofran 4 mg every 6 hours as needed, trazodone 100 mg at bedtime, Sinemet  mg 3 times daily, Biotene dry mouth moisturizer, Lipitor 20 mg daily, folic acid 1 mg daily, Effexor  mg daily, valproic acid 250 mg twice daily, ax, artificial tears, Pyridium 200 mg every 12 hours as needed, Estrace 0.1 mg/g insert 0.1 g vaginally at bedtime for 14 days, Abilify 15 mg daily, Celexa 10 mg daily     SOCIAL/FAMILY HISTORY  Reviewed as previously documented      Review of Systems  All system review as allowed by patient condition and nursing input is negative        Active Problems  Problems    · Dementia with parkinsonism (331.82,294.10) (G20,F02.80)   · Depression with anxiety (300.4) (F41.8)   · Family history of breast cancer (V16.3) (Z80.3)   · Fibrocystic breast (610.1) (N60.19)   · Frontal lobe deficit (799.55) (R41.844)   · Memory loss (780.93) (R41.3)   · Neurocognitive deficits (781.99) (R29.818,R41.89)   · Parkinsonism (332.0) (G20)   · Progressive  supranuclear palsy (333.0) (G23.1)   · Schizoaffective disorder, mixed type (295.70) (F25.0)     Past Medical History  Problems    · History of anxiety (V11.8) (Z86.59)   · History of hypothyroidism (V12.29) (Z86.39)   · History of schizoaffective disorder (V11.0) (Z86.59)   · History of vitamin D deficiency (V12.1) (Z86.39)   · History of Polyp of vagina (623.7) (N84.2)   · History of Recurrent major depressive disorder, remission status unspecified (296.30)  (F33.9)   · History of Schizoaffective disorder, bipolar type without good prognostic features (295.70)  (F25.0)     Family History  Mother    · Family history of depression (V17.0) (Z81.8)  Father    · Family history of depression (V17.0) (Z81.8)  Sister    · Family history of breast cancer (V16.3) (Z80.3)  Brother    · Family history of mental disorder (V17.0) (Z81.8)  Maternal Aunt    · Family history of breast cancer (V16.3) (Z80.3)     Social History  Problems    · Born in [place]   · Completed college, bachelors degree   · General Sciences.   · Completed elementary school   · Completed graduate school, masters degree   · Criminal Justice Degree   · Completed high school   · Former smoker (V15.82) (Z87.891)   · Has no children   · Retired from employment   · Was a [employer]  from 1992 to 2009. Before that was [employer]      .   ·  (V61.07) (Z63.4)   ·  from 1989 to 2017 when her  passed away.     Allergies  Medication    · Penicillins   Recorded By: Jessica Felix; 1/16/2014 11:48:33 AM   · Tetracyclines   Recorded By: Jessica Felix; 1/16/2014 11:48:33 AM           Physical Exam     Vital signs as per nursing/MA documentation  General appearance: Alert and in no acute distress  HEENT: Normal Inspection  Neck - Normal Inspectiopn  Respiratory : No respiratory distress. Lungs are clear   Cardiovascular: heart rate normal. No gallop  Back - normal inspection  Skin inspection:Warm  Musculoskeletal : No  deformities  Neuro : Limited exam. Baseline  Psychiatric : Cooperative      Electronically Signed By: Tereso Acharya MD   5/9/24  6:21 PM

## 2024-05-09 NOTE — PROGRESS NOTES
Provider Impression           Jonah     1. Abdominal pain -chronic  2. Chronic constipation. Continue with a bowel regimen. The patient does have bowel movements every day  3.Severe anxiety and depression managed by psych consultants. Patient is on number of psychotropic agents. Please see HPI for medication list  4. 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 Contributing to her constipation issues  5. Nutritional status is adequate with no concerns. Patient remains obese.  6. Progressive supranuclear ophthalmoplegia. Patient on valproic acid.  7. Skin remains intact without breakdown or decubiti  8. Hypothyroidism currently supplemented     This is a woman who has multiple medical problems including rather severe mental illness. This has resulted in significant self-care deficits. She requires assistance in all activities of daily living. We will work-up this abdominal pain and her complaints. Blood work and ultrasound imaging has been ordered. We will make further recommendations following review of these results.       This patient was seen for my regular monthly visit, nursing evaluations and nursing notes were reviewed, interim events are reviewed, interim concerns and messages were reviewed as we have communicated with nursing staff.  Any issues with the falls, skin care impairment, declining physical condition are reviewed and noted, diagnosis list were reviewed, list of medications were reviewed, living will related issues were reviewed, overall patient has been doing well, any declining in patient's condition or any change in patient's condition needs to be notified to physician promptly, discussed with nursing staff, if needed would communicate with family.  Patient stays confined here at the facility for long-term care, there are always concerns about long-term care related issues and concerns.  Nursing staff is trying their best to keep patient safe, all sort of measures has been taken to keep  patient safe and comfortable.                  History of Present Illness       Progress note  DNR [facility]        This is a  long-term resident.      chronic abd pain     Patient suffers from schizoaffective disorder, major depression, bipolar disorder, anxiety disorder, paranoia with psychosis. She has had ECT in the past.        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, generalized weakness, impaired mobility, thrombocytopenia, vitamin D deficiency, hypothyroidism     MEDICATION  Facility protocol meds as needed, vitamin B12 1000 mcg daily, nystatin ointment, omeprazole 20 mg daily, vitamin D3 1000 units daily, clonazepam 0.5 mg 3 tablets twice daily, Synthroid 50 mcg daily, Zofran 4 mg every 6 hours as needed, trazodone 100 mg at bedtime, Sinemet  mg 3 times daily, Biotene dry mouth moisturizer, Lipitor 20 mg daily, folic acid 1 mg daily, Effexor  mg daily, valproic acid 250 mg twice daily, ax, artificial tears, Pyridium 200 mg every 12 hours as needed, Estrace 0.1 mg/g insert 0.1 g vaginally at bedtime for 14 days, Abilify 15 mg daily, Celexa 10 mg daily     SOCIAL/FAMILY HISTORY  Reviewed as previously documented      Review of Systems  All system review as allowed by patient condition and nursing input is negative        Active Problems  Problems    · Dementia with parkinsonism (331.82,294.10) (G20,F02.80)   · Depression with anxiety (300.4) (F41.8)   · Family history of breast cancer (V16.3) (Z80.3)   · Fibrocystic breast (610.1) (N60.19)   · Frontal lobe deficit (799.55) (R41.844)   · Memory loss (780.93) (R41.3)   · Neurocognitive deficits (781.99) (R29.818,R41.89)   · Parkinsonism (332.0) (G20)   · Progressive supranuclear palsy (333.0) (G23.1)   · Schizoaffective disorder, mixed  type (295.70) (F25.0)     Past Medical History  Problems    · History of anxiety (V11.8) (Z86.59)   · History of hypothyroidism (V12.29) (Z86.39)   · History of schizoaffective disorder (V11.0) (Z86.59)   · History of vitamin D deficiency (V12.1) (Z86.39)   · History of Polyp of vagina (623.7) (N84.2)   · History of Recurrent major depressive disorder, remission status unspecified (296.30)  (F33.9)   · History of Schizoaffective disorder, bipolar type without good prognostic features (295.70)  (F25.0)     Family History  Mother    · Family history of depression (V17.0) (Z81.8)  Father    · Family history of depression (V17.0) (Z81.8)  Sister    · Family history of breast cancer (V16.3) (Z80.3)  Brother    · Family history of mental disorder (V17.0) (Z81.8)  Maternal Aunt    · Family history of breast cancer (V16.3) (Z80.3)     Social History  Problems    · Born in [place]   · Completed college, bachelors degree   · General Sciences.   · Completed elementary school   · Completed graduate school, masters degree   · Criminal Justice Degree   · Completed high school   · Former smoker (V15.82) (Z87.891)   · Has no children   · Retired from employment   · Was a [employer]  from 1992 to 2009. Before that was [employer]      .   ·  (V61.07) (Z63.4)   ·  from 1989 to 2017 when her  passed away.     Allergies  Medication    · Penicillins   Recorded By: Jessica Felix; 1/16/2014 11:48:33 AM   · Tetracyclines   Recorded By: Jessica Felix; 1/16/2014 11:48:33 AM           Physical Exam     Vital signs as per nursing/MA documentation  General appearance: Alert and in no acute distress  HEENT: Normal Inspection  Neck - Normal Inspectiopn  Respiratory : No respiratory distress. Lungs are clear   Cardiovascular: heart rate normal. No gallop  Back - normal inspection  Skin inspection:Warm  Musculoskeletal : No deformities  Neuro : Limited exam. Baseline  Psychiatric :  Cooperative

## 2024-06-18 ENCOUNTER — NURSING HOME VISIT (OUTPATIENT)
Dept: POST ACUTE CARE | Facility: EXTERNAL LOCATION | Age: 69
End: 2024-06-18
Payer: MEDICARE

## 2024-06-18 DIAGNOSIS — F25.9 SCHIZOAFFECTIVE DISORDER, UNSPECIFIED TYPE (MULTI): ICD-10-CM

## 2024-06-18 DIAGNOSIS — F29 ATYPICAL PSYCHOSIS (MULTI): Primary | ICD-10-CM

## 2024-06-18 DIAGNOSIS — E46 PROTEIN-CALORIE MALNUTRITION, UNSPECIFIED SEVERITY (MULTI): ICD-10-CM

## 2024-06-18 DIAGNOSIS — F31.9 BIPOLAR AFFECTIVE DISORDER, REMISSION STATUS UNSPECIFIED (MULTI): ICD-10-CM

## 2024-06-18 PROCEDURE — 99308 SBSQ NF CARE LOW MDM 20: CPT | Performed by: EMERGENCY MEDICINE

## 2024-06-18 NOTE — LETTER
Patient: Daysi oJhn  : 1955    Encounter Date: 2024    Provider Impression           Jonah     1. Abdominal pain -chronic  2. Chronic constipation. Continue with a bowel regimen. The patient does have bowel movements every day  3.Severe anxiety and depression managed by psych consultants. Patient is on number of psychotropic agents. Please see HPI for medication list  4. 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 Contributing to her constipation issues  5. Nutritional status is adequate with no concerns. Patient remains obese.  6. Progressive supranuclear ophthalmoplegia. Patient on valproic acid.  7. Skin remains intact without breakdown or decubiti  8. Hypothyroidism currently supplemented     This is a woman who has multiple medical problems including rather severe mental illness. This has resulted in significant self-care deficits. She requires assistance in all activities of daily living. We will work-up this abdominal pain and her complaints. Blood work and ultrasound imaging has been ordered. We will make further recommendations following review of these results.       -Fall prevention    -Cognitive engagement    -Monitor and treat blood pressure    -Aggressive decubitus ulcer prevention.    -Bowel and bladder care    -Optimal nutrition and supplementation as needed    -GI  and DVT prophylaxis    -Symptom control    -Ambulation as tolerated    -Will follow                  History of Present Illness       Progress note  DNR [facility]        This is a  long-term resident.      chronic abd pain     Patient suffers from schizoaffective disorder, major depression, bipolar disorder, anxiety disorder, paranoia with psychosis. She has had ECT in the past.        [facility]  Schizoaffective disorder, major depression, bipolar disorder, anxiety disorder, paranoia with psychosis having ECT therapy remotely, progressive supranuclear ophthalmoplegia, frequent falls, protein calorie malnutrition, self-care  deficits, mixed a phase he, generalized weakness, impaired mobility, thrombocytopenia, vitamin D deficiency, hypothyroidism     MEDICATION  Facility protocol meds as needed, vitamin B12 1000 mcg daily, nystatin ointment, omeprazole 20 mg daily, vitamin D3 1000 units daily, clonazepam 0.5 mg 3 tablets twice daily, Synthroid 50 mcg daily, Zofran 4 mg every 6 hours as needed, trazodone 100 mg at bedtime, Sinemet  mg 3 times daily, Biotene dry mouth moisturizer, Lipitor 20 mg daily, folic acid 1 mg daily, Effexor  mg daily, valproic acid 250 mg twice daily, ax, artificial tears, Pyridium 200 mg every 12 hours as needed, Estrace 0.1 mg/g insert 0.1 g vaginally at bedtime for 14 days, Abilify 15 mg daily, Celexa 10 mg daily     SOCIAL/FAMILY HISTORY  Reviewed as previously documented      Review of Systems  All system review as allowed by patient condition and nursing input is negative        Active Problems  Problems    · Dementia with parkinsonism (331.82,294.10) (G20,F02.80)   · Depression with anxiety (300.4) (F41.8)   · Family history of breast cancer (V16.3) (Z80.3)   · Fibrocystic breast (610.1) (N60.19)   · Frontal lobe deficit (799.55) (R41.844)   · Memory loss (780.93) (R41.3)   · Neurocognitive deficits (781.99) (R29.818,R41.89)   · Parkinsonism (332.0) (G20)   · Progressive supranuclear palsy (333.0) (G23.1)   · Schizoaffective disorder, mixed type (295.70) (F25.0)     Past Medical History  Problems    · History of anxiety (V11.8) (Z86.59)   · History of hypothyroidism (V12.29) (Z86.39)   · History of schizoaffective disorder (V11.0) (Z86.59)   · History of vitamin D deficiency (V12.1) (Z86.39)   · History of Polyp of vagina (623.7) (N84.2)   · History of Recurrent major depressive disorder, remission status unspecified (296.30)  (F33.9)   · History of Schizoaffective disorder, bipolar type without good prognostic features (295.70)  (F25.0)     Family History  Mother    · Family history of  depression (V17.0) (Z81.8)  Father    · Family history of depression (V17.0) (Z81.8)  Sister    · Family history of breast cancer (V16.3) (Z80.3)  Brother    · Family history of mental disorder (V17.0) (Z81.8)  Maternal Aunt    · Family history of breast cancer (V16.3) (Z80.3)     Social History  Problems    · Born in [place]   · Completed college, bachelors degree   · General Sciences.   · Completed elementary school   · Completed graduate school, masters degree   · Criminal Justice Degree   · Completed high school   · Former smoker (V15.82) (Z87.891)   · Has no children   · Retired from employment   · Was a [employer]  from 1992 to 2009. Before that was [employer]      .   ·  (V61.07) (Z63.4)   ·  from 1989 to 2017 when her  passed away.     Allergies  Medication    · Penicillins   Recorded By: Jessica Felix; 1/16/2014 11:48:33 AM   · Tetracyclines   Recorded By: Jessica Felix; 1/16/2014 11:48:33 AM           Physical Exam     Vital signs as per nursing/MA documentation  General appearance: Alert and in no acute distress  HEENT: Normal Inspection  Neck - Normal Inspectiopn  Respiratory : No respiratory distress. Lungs are clear   Cardiovascular: heart rate normal. No gallop  Back - normal inspection  Skin inspection:Warm  Musculoskeletal : No deformities  Neuro : Limited exam. Baseline  Psychiatric : Cooperative      Electronically Signed By: Tereso Acharya MD   6/25/24  5:11 PM

## 2024-06-21 NOTE — PROGRESS NOTES
Provider Impression           Jonah     1. Abdominal pain -chronic  2. Chronic constipation. Continue with a bowel regimen. The patient does have bowel movements every day  3.Severe anxiety and depression managed by psych consultants. Patient is on number of psychotropic agents. Please see HPI for medication list  4. 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 Contributing to her constipation issues  5. Nutritional status is adequate with no concerns. Patient remains obese.  6. Progressive supranuclear ophthalmoplegia. Patient on valproic acid.  7. Skin remains intact without breakdown or decubiti  8. Hypothyroidism currently supplemented     This is a woman who has multiple medical problems including rather severe mental illness. This has resulted in significant self-care deficits. She requires assistance in all activities of daily living. We will work-up this abdominal pain and her complaints. Blood work and ultrasound imaging has been ordered. We will make further recommendations following review of these results.       -Fall prevention    -Cognitive engagement    -Monitor and treat blood pressure    -Aggressive decubitus ulcer prevention.    -Bowel and bladder care    -Optimal nutrition and supplementation as needed    -GI  and DVT prophylaxis    -Symptom control    -Ambulation as tolerated    -Will follow                  History of Present Illness       Progress note  DNR [facility]        This is a  long-term resident.      chronic abd pain     Patient suffers from schizoaffective disorder, major depression, bipolar disorder, anxiety disorder, paranoia with psychosis. She has had ECT in the past.        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, generalized weakness, impaired mobility,  thrombocytopenia, vitamin D deficiency, hypothyroidism     MEDICATION  Facility protocol meds as needed, vitamin B12 1000 mcg daily, nystatin ointment, omeprazole 20 mg daily, vitamin D3 1000 units daily, clonazepam 0.5 mg 3 tablets twice daily, Synthroid 50 mcg daily, Zofran 4 mg every 6 hours as needed, trazodone 100 mg at bedtime, Sinemet  mg 3 times daily, Biotene dry mouth moisturizer, Lipitor 20 mg daily, folic acid 1 mg daily, Effexor  mg daily, valproic acid 250 mg twice daily, ax, artificial tears, Pyridium 200 mg every 12 hours as needed, Estrace 0.1 mg/g insert 0.1 g vaginally at bedtime for 14 days, Abilify 15 mg daily, Celexa 10 mg daily     SOCIAL/FAMILY HISTORY  Reviewed as previously documented      Review of Systems  All system review as allowed by patient condition and nursing input is negative        Active Problems  Problems    · Dementia with parkinsonism (331.82,294.10) (G20,F02.80)   · Depression with anxiety (300.4) (F41.8)   · Family history of breast cancer (V16.3) (Z80.3)   · Fibrocystic breast (610.1) (N60.19)   · Frontal lobe deficit (799.55) (R41.844)   · Memory loss (780.93) (R41.3)   · Neurocognitive deficits (781.99) (R29.818,R41.89)   · Parkinsonism (332.0) (G20)   · Progressive supranuclear palsy (333.0) (G23.1)   · Schizoaffective disorder, mixed type (295.70) (F25.0)     Past Medical History  Problems    · History of anxiety (V11.8) (Z86.59)   · History of hypothyroidism (V12.29) (Z86.39)   · History of schizoaffective disorder (V11.0) (Z86.59)   · History of vitamin D deficiency (V12.1) (Z86.39)   · History of Polyp of vagina (623.7) (N84.2)   · History of Recurrent major depressive disorder, remission status unspecified (296.30)  (F33.9)   · History of Schizoaffective disorder, bipolar type without good prognostic features (295.70)  (F25.0)     Family History  Mother    · Family history of depression (V17.0) (Z81.8)  Father    · Family history of depression (V17.0)  (Z81.8)  Sister    · Family history of breast cancer (V16.3) (Z80.3)  Brother    · Family history of mental disorder (V17.0) (Z81.8)  Maternal Aunt    · Family history of breast cancer (V16.3) (Z80.3)     Social History  Problems    · Born in [place]   · Completed college, bachelors degree   · General Sciences.   · Completed elementary school   · Completed graduate school, masters degree   · Criminal Justice Degree   · Completed high school   · Former smoker (V15.82) (Z87.891)   · Has no children   · Retired from employment   · Was a [employer]  from 1992 to 2009. Before that was [employer]      .   ·  (V61.07) (Z63.4)   ·  from 1989 to 2017 when her  passed away.     Allergies  Medication    · Penicillins   Recorded By: Jessica Felix; 1/16/2014 11:48:33 AM   · Tetracyclines   Recorded By: Jessica Felix; 1/16/2014 11:48:33 AM           Physical Exam     Vital signs as per nursing/MA documentation  General appearance: Alert and in no acute distress  HEENT: Normal Inspection  Neck - Normal Inspectiopn  Respiratory : No respiratory distress. Lungs are clear   Cardiovascular: heart rate normal. No gallop  Back - normal inspection  Skin inspection:Warm  Musculoskeletal : No deformities  Neuro : Limited exam. Baseline  Psychiatric : Cooperative

## 2024-07-09 ENCOUNTER — NURSING HOME VISIT (OUTPATIENT)
Dept: POST ACUTE CARE | Facility: EXTERNAL LOCATION | Age: 69
End: 2024-07-09
Payer: MEDICARE

## 2024-07-09 DIAGNOSIS — F25.9 SCHIZOAFFECTIVE DISORDER, UNSPECIFIED TYPE (MULTI): ICD-10-CM

## 2024-07-09 DIAGNOSIS — F31.9 BIPOLAR AFFECTIVE DISORDER, REMISSION STATUS UNSPECIFIED (MULTI): ICD-10-CM

## 2024-07-09 DIAGNOSIS — F29 ATYPICAL PSYCHOSIS (MULTI): Primary | ICD-10-CM

## 2024-07-09 DIAGNOSIS — E03.9 HYPOTHYROIDISM, UNSPECIFIED TYPE: ICD-10-CM

## 2024-07-09 PROCEDURE — 99309 SBSQ NF CARE MODERATE MDM 30: CPT | Performed by: EMERGENCY MEDICINE

## 2024-07-09 NOTE — LETTER
Patient: Daysi John  : 1955    Encounter Date: 2024    Provider Impression           Jonah     1. Abdominal pain -chronic  2. Chronic constipation. Continue with a bowel regimen. The patient does have bowel movements every day  3.Severe anxiety and depression managed by psych consultants. Patient is on number of psychotropic agents. Please see HPI for medication list  4. 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 Contributing to her constipation issues  5. Nutritional status is adequate with no concerns. Patient remains obese.  6. Progressive supranuclear ophthalmoplegia. Patient on valproic acid.  7. Skin remains intact without breakdown or decubiti  8. Hypothyroidism currently supplemented     This is a woman who has multiple medical problems including rather severe mental illness. This has resulted in significant self-care deficits. She requires assistance in all activities of daily living. We will work-up this abdominal pain and her complaints. Blood work and ultrasound imaging has been ordered. We will make further recommendations following review of these results.       Provide safe environment for the patient    Continue current medication regimen    OT PT and speech therapy    Bowel and bladder,skin care    Nutritional support    Monitor and treat blood glucose    GI  and DVT prophylaxis    PRN medications    Periodic lab work    Regular Follow up                   History of Present Illness       Progress note  DNR [facility]        This is a  long-term resident.      chronic abd pain     Patient suffers from schizoaffective disorder, major depression, bipolar disorder, anxiety disorder, paranoia with psychosis. She has had ECT in the past.        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, generalized weakness, impaired mobility, thrombocytopenia, vitamin D deficiency, hypothyroidism     MEDICATION  Facility protocol meds as needed, vitamin B12 1000 mcg daily, nystatin ointment, omeprazole 20 mg daily, vitamin D3 1000 units daily, clonazepam 0.5 mg 3 tablets twice daily, Synthroid 50 mcg daily, Zofran 4 mg every 6 hours as needed, trazodone 100 mg at bedtime, Sinemet  mg 3 times daily, Biotene dry mouth moisturizer, Lipitor 20 mg daily, folic acid 1 mg daily, Effexor  mg daily, valproic acid 250 mg twice daily, ax, artificial tears, Pyridium 200 mg every 12 hours as needed, Estrace 0.1 mg/g insert 0.1 g vaginally at bedtime for 14 days, Abilify 15 mg daily, Celexa 10 mg daily     SOCIAL/FAMILY HISTORY  Reviewed as previously documented      Review of Systems  All system review as allowed by patient condition and nursing input is negative        Active Problems  Problems    · Dementia with parkinsonism (331.82,294.10) (G20,F02.80)   · Depression with anxiety (300.4) (F41.8)   · Family history of breast cancer (V16.3) (Z80.3)   · Fibrocystic breast (610.1) (N60.19)   · Frontal lobe deficit (799.55) (R41.844)   · Memory loss (780.93) (R41.3)   · Neurocognitive deficits (781.99) (R29.818,R41.89)   · Parkinsonism (332.0) (G20)   · Progressive supranuclear palsy (333.0) (G23.1)   · Schizoaffective disorder, mixed type (295.70) (F25.0)     Past Medical History  Problems    · History of anxiety (V11.8) (Z86.59)   · History of hypothyroidism (V12.29) (Z86.39)   · History of schizoaffective disorder (V11.0) (Z86.59)   · History of vitamin D deficiency (V12.1) (Z86.39)   · History of Polyp of vagina (623.7) (N84.2)   · History of Recurrent major depressive disorder, remission status unspecified (296.30)  (F33.9)   · History of Schizoaffective disorder, bipolar type without good prognostic features (295.70)  (F25.0)     Family History  Mother    · Family history of depression (V17.0)  (Z81.8)  Father    · Family history of depression (V17.0) (Z81.8)  Sister    · Family history of breast cancer (V16.3) (Z80.3)  Brother    · Family history of mental disorder (V17.0) (Z81.8)  Maternal Aunt    · Family history of breast cancer (V16.3) (Z80.3)     Social History  Problems    · Born in [place]   · Completed college, bachelors degree   · General Sciences.   · Completed elementary school   · Completed graduate school, masters degree   · Criminal Justice Degree   · Completed high school   · Former smoker (V15.82) (Z87.891)   · Has no children   · Retired from employment   · Was a [employer]  from 1992 to 2009. Before that was [employer]      .   ·  (V61.07) (Z63.4)   ·  from 1989 to 2017 when her  passed away.     Allergies  Medication    · Penicillins   Recorded By: Jessica Felix; 1/16/2014 11:48:33 AM   · Tetracyclines   Recorded By: Jessica Felix; 1/16/2014 11:48:33 AM           Physical Exam     Vital signs as per nursing/MA documentation  General appearance: Alert and in no acute distress  HEENT: Normal Inspection  Neck - Normal Inspectiopn  Respiratory : No respiratory distress. Lungs are clear   Cardiovascular: heart rate normal. No gallop  Back - normal inspection  Skin inspection:Warm  Musculoskeletal : No deformities  Neuro : Limited exam. Baseline  Psychiatric : Cooperative      Electronically Signed By: Tereso Acharya MD   7/30/24  5:57 PM

## 2024-07-24 NOTE — PROGRESS NOTES
Provider Impression           Jonah     1. Abdominal pain -chronic  2. Chronic constipation. Continue with a bowel regimen. The patient does have bowel movements every day  3.Severe anxiety and depression managed by psych consultants. Patient is on number of psychotropic agents. Please see HPI for medication list  4. 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 Contributing to her constipation issues  5. Nutritional status is adequate with no concerns. Patient remains obese.  6. Progressive supranuclear ophthalmoplegia. Patient on valproic acid.  7. Skin remains intact without breakdown or decubiti  8. Hypothyroidism currently supplemented     This is a woman who has multiple medical problems including rather severe mental illness. This has resulted in significant self-care deficits. She requires assistance in all activities of daily living. We will work-up this abdominal pain and her complaints. Blood work and ultrasound imaging has been ordered. We will make further recommendations following review of these results.       Provide safe environment for the patient    Continue current medication regimen    OT PT and speech therapy    Bowel and bladder,skin care    Nutritional support    Monitor and treat blood glucose    GI  and DVT prophylaxis    PRN medications    Periodic lab work    Regular Follow up                   History of Present Illness       Progress note  DNR CCA        This is a  long-term resident.      chronic abd pain     Patient suffers from schizoaffective disorder, major depression, bipolar disorder, anxiety disorder, paranoia with psychosis. She has had ECT in the past.        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, generalized weakness, impaired mobility,  thrombocytopenia, vitamin D deficiency, hypothyroidism     MEDICATION  Facility protocol meds as needed, vitamin B12 1000 mcg daily, nystatin ointment, omeprazole 20 mg daily, vitamin D3 1000 units daily, clonazepam 0.5 mg 3 tablets twice daily, Synthroid 50 mcg daily, Zofran 4 mg every 6 hours as needed, trazodone 100 mg at bedtime, Sinemet  mg 3 times daily, Biotene dry mouth moisturizer, Lipitor 20 mg daily, folic acid 1 mg daily, Effexor  mg daily, valproic acid 250 mg twice daily, ax, artificial tears, Pyridium 200 mg every 12 hours as needed, Estrace 0.1 mg/g insert 0.1 g vaginally at bedtime for 14 days, Abilify 15 mg daily, Celexa 10 mg daily     SOCIAL/FAMILY HISTORY  Reviewed as previously documented      Review of Systems  All system review as allowed by patient condition and nursing input is negative        Active Problems  Problems    · Dementia with parkinsonism (331.82,294.10) (G20,F02.80)   · Depression with anxiety (300.4) (F41.8)   · Family history of breast cancer (V16.3) (Z80.3)   · Fibrocystic breast (610.1) (N60.19)   · Frontal lobe deficit (799.55) (R41.844)   · Memory loss (780.93) (R41.3)   · Neurocognitive deficits (781.99) (R29.818,R41.89)   · Parkinsonism (332.0) (G20)   · Progressive supranuclear palsy (333.0) (G23.1)   · Schizoaffective disorder, mixed type (295.70) (F25.0)     Past Medical History  Problems    · History of anxiety (V11.8) (Z86.59)   · History of hypothyroidism (V12.29) (Z86.39)   · History of schizoaffective disorder (V11.0) (Z86.59)   · History of vitamin D deficiency (V12.1) (Z86.39)   · History of Polyp of vagina (623.7) (N84.2)   · History of Recurrent major depressive disorder, remission status unspecified (296.30)  (F33.9)   · History of Schizoaffective disorder, bipolar type without good prognostic features (295.70)  (F25.0)     Family History  Mother    · Family history of depression (V17.0) (Z81.8)  Father    · Family history of depression (V17.0)  (Z81.8)  Sister    · Family history of breast cancer (V16.3) (Z80.3)  Brother    · Family history of mental disorder (V17.0) (Z81.8)  Maternal Aunt    · Family history of breast cancer (V16.3) (Z80.3)     Social History  Problems    · Born in [place]   · Completed college, bachelors degree   · General Sciences.   · Completed elementary school   · Completed graduate school, masters degree   · Criminal Justice Degree   · Completed high school   · Former smoker (V15.82) (Z87.891)   · Has no children   · Retired from employment   · Was a [employer]  from 1992 to 2009. Before that was [employer]      .   ·  (V61.07) (Z63.4)   ·  from 1989 to 2017 when her  passed away.     Allergies  Medication    · Penicillins   Recorded By: Jessica Felix; 1/16/2014 11:48:33 AM   · Tetracyclines   Recorded By: Jessica Felix; 1/16/2014 11:48:33 AM           Physical Exam     Vital signs as per nursing/MA documentation  General appearance: Alert and in no acute distress  HEENT: Normal Inspection  Neck - Normal Inspectiopn  Respiratory : No respiratory distress. Lungs are clear   Cardiovascular: heart rate normal. No gallop  Back - normal inspection  Skin inspection:Warm  Musculoskeletal : No deformities  Neuro : Limited exam. Baseline  Psychiatric : Cooperative

## 2024-08-01 ENCOUNTER — NURSING HOME VISIT (OUTPATIENT)
Dept: POST ACUTE CARE | Facility: EXTERNAL LOCATION | Age: 69
End: 2024-08-01
Payer: MEDICARE

## 2024-08-01 DIAGNOSIS — F25.9 SCHIZOAFFECTIVE DISORDER, UNSPECIFIED TYPE (MULTI): Primary | ICD-10-CM

## 2024-08-01 DIAGNOSIS — F29 ATYPICAL PSYCHOSIS (MULTI): ICD-10-CM

## 2024-08-01 DIAGNOSIS — E03.9 HYPOTHYROIDISM, UNSPECIFIED TYPE: ICD-10-CM

## 2024-08-01 DIAGNOSIS — F31.9 BIPOLAR AFFECTIVE DISORDER, REMISSION STATUS UNSPECIFIED (MULTI): ICD-10-CM

## 2024-08-01 PROCEDURE — 99308 SBSQ NF CARE LOW MDM 20: CPT | Performed by: EMERGENCY MEDICINE

## 2024-08-01 NOTE — LETTER
Patient: Daysi John  : 1955    Encounter Date: 2024    Provider Impression           Jonah     1. Abdominal pain -chronic  2. Chronic constipation. Continue with a bowel regimen. The patient does have bowel movements every day  3.Severe anxiety and depression managed by psych consultants. Patient is on number of psychotropic agents. Please see HPI for medication list  4. 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 Contributing to her constipation issues  5. Nutritional status is adequate with no concerns. Patient remains obese.  6. Progressive supranuclear ophthalmoplegia. Patient on valproic acid.  7. Skin remains intact without breakdown or decubiti  8. Hypothyroidism currently supplemented     This is a woman who has multiple medical problems including rather severe mental illness. This has resulted in significant self-care deficits. She requires assistance in all activities of daily living. We will work-up this abdominal pain and her complaints. Blood work and ultrasound imaging has been ordered. We will make further recommendations following review of these results.       Rx list reviewed.   PT and OT evaluation is in the process.   Routine safety measures, fall precautions, risk modification and alarm placement if needed for prevention of falls.   Skin care precautions, prevention of pressures sores at pressure points assessed.   Pt needs to be monitored frequently by nursing staff particularly at night time.   Any confusion, agitation or behavioural disturbance needs to be attended, as per home policy   rapid covid Ag assay need to be done, notify if positive.   If needed appropriate measures to be taken for alarm placements and assisted devices, pt was told not to get up and ambulate at night unless help and assist available at bedside,   labs will be done as per our routine protocol.   PO intake need to be monitored if consuming po.       Charting is done using voice recognition software  and may contain errors which have not been completely corrected                     History of Present Illness       Progress note  DNR CCA        This is a  long-term resident.      chronic abd pain     Patient suffers from schizoaffective disorder, major depression, bipolar disorder, anxiety disorder, paranoia with psychosis. She has had ECT in the past.        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, generalized weakness, impaired mobility, thrombocytopenia, vitamin D deficiency, hypothyroidism     MEDICATION  Facility protocol meds as needed, vitamin B12 1000 mcg daily, nystatin ointment, omeprazole 20 mg daily, vitamin D3 1000 units daily, clonazepam 0.5 mg 3 tablets twice daily, Synthroid 50 mcg daily, Zofran 4 mg every 6 hours as needed, trazodone 100 mg at bedtime, Sinemet  mg 3 times daily, Biotene dry mouth moisturizer, Lipitor 20 mg daily, folic acid 1 mg daily, Effexor  mg daily, valproic acid 250 mg twice daily, ax, artificial tears, Pyridium 200 mg every 12 hours as needed, Estrace 0.1 mg/g insert 0.1 g vaginally at bedtime for 14 days, Abilify 15 mg daily, Celexa 10 mg daily     SOCIAL/FAMILY HISTORY  Reviewed as previously documented      Review of Systems  All system review as allowed by patient condition and nursing input is negative        Active Problems  Problems    · Dementia with parkinsonism (331.82,294.10) (G20,F02.80)   · Depression with anxiety (300.4) (F41.8)   · Family history of breast cancer (V16.3) (Z80.3)   · Fibrocystic breast (610.1) (N60.19)   · Frontal lobe deficit (799.55) (R41.844)   · Memory loss (780.93) (R41.3)   · Neurocognitive deficits (781.99) (R29.818,R41.89)   · Parkinsonism (332.0) (G20)   · Progressive supranuclear palsy (333.0)  (G23.1)   · Schizoaffective disorder, mixed type (295.70) (F25.0)     Past Medical History  Problems    · History of anxiety (V11.8) (Z86.59)   · History of hypothyroidism (V12.29) (Z86.39)   · History of schizoaffective disorder (V11.0) (Z86.59)   · History of vitamin D deficiency (V12.1) (Z86.39)   · History of Polyp of vagina (623.7) (N84.2)   · History of Recurrent major depressive disorder, remission status unspecified (296.30)  (F33.9)   · History of Schizoaffective disorder, bipolar type without good prognostic features (295.70)  (F25.0)     Family History  Mother    · Family history of depression (V17.0) (Z81.8)  Father    · Family history of depression (V17.0) (Z81.8)  Sister    · Family history of breast cancer (V16.3) (Z80.3)  Brother    · Family history of mental disorder (V17.0) (Z81.8)  Maternal Aunt    · Family history of breast cancer (V16.3) (Z80.3)     Social History  Problems    · Born in [place]   · Completed college, bachelors degree   · General Sciences.   · Completed elementary school   · Completed graduate school, masters degree   · Criminal Justice Degree   · Completed high school   · Former smoker (V15.82) (Z87.891)   · Has no children   · Retired from employment   · Was a [employer]  from 1992 to 2009. Before that was [employer]      .   ·  (V61.07) (Z63.4)   ·  from 1989 to 2017 when her  passed away.     Allergies  Medication    · Penicillins   Recorded By: Jessica Felix; 1/16/2014 11:48:33 AM   · Tetracyclines   Recorded By: Jessica Felix; 1/16/2014 11:48:33 AM           Physical Exam     Vital signs as per nursing/MA documentation  General appearance: Alert and in no acute distress  HEENT: Normal Inspection  Neck - Normal Inspectiopn  Respiratory : No respiratory distress. Lungs are clear   Cardiovascular: heart rate normal. No gallop  Back - normal inspection  Skin inspection:Warm  Musculoskeletal : No deformities  Neuro :  Limited exam. Baseline  Psychiatric : Cooperative      Electronically Signed By: Tereso Acharya MD   8/8/24  4:17 PM

## 2024-08-05 NOTE — PROGRESS NOTES
Provider Impression           Jonah     1. Abdominal pain -chronic  2. Chronic constipation. Continue with a bowel regimen. The patient does have bowel movements every day  3.Severe anxiety and depression managed by psych consultants. Patient is on number of psychotropic agents. Please see HPI for medication list  4. 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 Contributing to her constipation issues  5. Nutritional status is adequate with no concerns. Patient remains obese.  6. Progressive supranuclear ophthalmoplegia. Patient on valproic acid.  7. Skin remains intact without breakdown or decubiti  8. Hypothyroidism currently supplemented     This is a woman who has multiple medical problems including rather severe mental illness. This has resulted in significant self-care deficits. She requires assistance in all activities of daily living. We will work-up this abdominal pain and her complaints. Blood work and ultrasound imaging has been ordered. We will make further recommendations following review of these results.       Rx list reviewed.   PT and OT evaluation is in the process.   Routine safety measures, fall precautions, risk modification and alarm placement if needed for prevention of falls.   Skin care precautions, prevention of pressures sores at pressure points assessed.   Pt needs to be monitored frequently by nursing staff particularly at night time.   Any confusion, agitation or behavioural disturbance needs to be attended, as per home policy   rapid covid Ag assay need to be done, notify if positive.   If needed appropriate measures to be taken for alarm placements and assisted devices, pt was told not to get up and ambulate at night unless help and assist available at bedside,   labs will be done as per our routine protocol.   PO intake need to be monitored if consuming po.       Charting is done using voice recognition software and may contain errors which have not been completely  corrected                     History of Present Illness       Progress note  DNR CCA        This is a  long-term resident.      chronic abd pain     Patient suffers from schizoaffective disorder, major depression, bipolar disorder, anxiety disorder, paranoia with psychosis. She has had ECT in the past.        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, generalized weakness, impaired mobility, thrombocytopenia, vitamin D deficiency, hypothyroidism     MEDICATION  Facility protocol meds as needed, vitamin B12 1000 mcg daily, nystatin ointment, omeprazole 20 mg daily, vitamin D3 1000 units daily, clonazepam 0.5 mg 3 tablets twice daily, Synthroid 50 mcg daily, Zofran 4 mg every 6 hours as needed, trazodone 100 mg at bedtime, Sinemet  mg 3 times daily, Biotene dry mouth moisturizer, Lipitor 20 mg daily, folic acid 1 mg daily, Effexor  mg daily, valproic acid 250 mg twice daily, ax, artificial tears, Pyridium 200 mg every 12 hours as needed, Estrace 0.1 mg/g insert 0.1 g vaginally at bedtime for 14 days, Abilify 15 mg daily, Celexa 10 mg daily     SOCIAL/FAMILY HISTORY  Reviewed as previously documented      Review of Systems  All system review as allowed by patient condition and nursing input is negative        Active Problems  Problems    · Dementia with parkinsonism (331.82,294.10) (G20,F02.80)   · Depression with anxiety (300.4) (F41.8)   · Family history of breast cancer (V16.3) (Z80.3)   · Fibrocystic breast (610.1) (N60.19)   · Frontal lobe deficit (799.55) (R41.844)   · Memory loss (780.93) (R41.3)   · Neurocognitive deficits (781.99) (R29.818,R41.89)   · Parkinsonism (332.0) (G20)   · Progressive supranuclear palsy (333.0) (G23.1)   · Schizoaffective disorder, mixed type (295.70)  (F25.0)     Past Medical History  Problems    · History of anxiety (V11.8) (Z86.59)   · History of hypothyroidism (V12.29) (Z86.39)   · History of schizoaffective disorder (V11.0) (Z86.59)   · History of vitamin D deficiency (V12.1) (Z86.39)   · History of Polyp of vagina (623.7) (N84.2)   · History of Recurrent major depressive disorder, remission status unspecified (296.30)  (F33.9)   · History of Schizoaffective disorder, bipolar type without good prognostic features (295.70)  (F25.0)     Family History  Mother    · Family history of depression (V17.0) (Z81.8)  Father    · Family history of depression (V17.0) (Z81.8)  Sister    · Family history of breast cancer (V16.3) (Z80.3)  Brother    · Family history of mental disorder (V17.0) (Z81.8)  Maternal Aunt    · Family history of breast cancer (V16.3) (Z80.3)     Social History  Problems    · Born in [place]   · Completed college, bachelors degree   · General Sciences.   · Completed elementary school   · Completed graduate school, masters degree   · Criminal Justice Degree   · Completed high school   · Former smoker (V15.82) (Z87.891)   · Has no children   · Retired from employment   · Was a [employer]  from 1992 to 2009. Before that was [employer]      .   ·  (V61.07) (Z63.4)   ·  from 1989 to 2017 when her  passed away.     Allergies  Medication    · Penicillins   Recorded By: Jessica Felix; 1/16/2014 11:48:33 AM   · Tetracyclines   Recorded By: Jessica Felix; 1/16/2014 11:48:33 AM           Physical Exam     Vital signs as per nursing/MA documentation  General appearance: Alert and in no acute distress  HEENT: Normal Inspection  Neck - Normal Inspectiopn  Respiratory : No respiratory distress. Lungs are clear   Cardiovascular: heart rate normal. No gallop  Back - normal inspection  Skin inspection:Warm  Musculoskeletal : No deformities  Neuro : Limited exam. Baseline  Psychiatric : Cooperative

## 2024-09-12 ENCOUNTER — NURSING HOME VISIT (OUTPATIENT)
Dept: POST ACUTE CARE | Facility: EXTERNAL LOCATION | Age: 69
End: 2024-09-12
Payer: MEDICARE

## 2024-09-12 DIAGNOSIS — E03.9 HYPOTHYROIDISM, UNSPECIFIED TYPE: ICD-10-CM

## 2024-09-12 DIAGNOSIS — F25.9 SCHIZOAFFECTIVE DISORDER, UNSPECIFIED TYPE: ICD-10-CM

## 2024-09-12 DIAGNOSIS — Z00.00 WELLNESS EXAMINATION: Primary | ICD-10-CM

## 2024-09-12 DIAGNOSIS — F29 ATYPICAL PSYCHOSIS (MULTI): ICD-10-CM

## 2024-09-12 DIAGNOSIS — F31.9 BIPOLAR AFFECTIVE DISORDER, REMISSION STATUS UNSPECIFIED (MULTI): ICD-10-CM

## 2024-09-12 DIAGNOSIS — F25.0 SCHIZOAFFECTIVE DISORDER, BIPOLAR TYPE (MULTI): ICD-10-CM

## 2024-09-12 PROCEDURE — G0439 PPPS, SUBSEQ VISIT: HCPCS | Performed by: EMERGENCY MEDICINE

## 2024-09-12 PROCEDURE — 99497 ADVNCD CARE PLAN 30 MIN: CPT | Performed by: EMERGENCY MEDICINE

## 2024-09-12 PROCEDURE — 99309 SBSQ NF CARE MODERATE MDM 30: CPT | Performed by: EMERGENCY MEDICINE

## 2024-09-12 NOTE — LETTER
Patient: Daysi John  : 1955    Encounter Date: 2024    Provider Impression      Patient is being seen for subsequent Medicare wellness and/or physical    Past Medical, Surgical and Family History: reviewed and updated in chart.   Medications and Supplements: Review of all medications by a prescribing practitioner or clinical pharmacist (such as prescriptions, OTCs, herbal therapies and supplements) documented in the medical record.    No, the patient is not using opioids.   Patient Self Assessment of Health Status: fair.   Tobacco use: Non-User The patient is a former cigarette smoker.   Alcohol use: As noted in social history   Illicit drug use: As noted in social history   Current diet: well balanced diet.   Exercise Frequency: the patient does not exercise.   Depression/Suicide Screening:  .   During the past 2 weeks, the patient has not felt down, depressed or hopeless.    During the past 2 weeks, the patient has not felt little interest or pleasure in doing things.    Hearing Impairment: Patient has slight hearing impairment.   Cognitive Impairment: Cognitive impairment was observed.      Bathing: dependent.   Dressing: dependent.   Walking: dependent.   Toileting: dependent.   Feeding: dependent.   Personal Hygiene: dependent.   Managing Finances: dependent.   Shopping: dependent.   Managing Medications: dependent.   Housework / Basic Home Maintenance: dependent.   Handling Transportation: dependent.   Preparing Meals: dependent.   Using the Telephone/ Communication Devices: dependent.    Falls Risk Screening:. Has not fallen in the last 6 months.   Home safety risk factors: none.   Advance directives:. Advanced Care Planning discussed and documented advance care plan or surrogate decision maker documented in the medical record.   A Conversation about Advance directives of at least 16 minutes has occurred. Actual time spent: 20   Topics discussed: Code status per nursing documentation filed  with facility.           Jonah     1. Abdominal pain -chronic  2. Chronic constipation. Continue with a bowel regimen. The patient does have bowel movements every day  3.Severe anxiety and depression managed by psych consultants. Patient is on number of psychotropic agents. Please see HPI for medication list  4. 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 Contributing to her constipation issues  5. Nutritional status is adequate with no concerns. Patient remains obese.  6. Progressive supranuclear ophthalmoplegia. Patient on valproic acid.  7. Skin remains intact without breakdown or decubiti  8. Hypothyroidism currently supplemented     This is a woman who has multiple medical problems including rather severe mental illness. This has resulted in significant self-care deficits. She requires assistance in all activities of daily living. We will work-up this abdominal pain and her complaints. Blood work and ultrasound imaging has been ordered. We will make further recommendations following review of these results.       1. medications are reviewed      2. Continue with rehabilitative, supportive, and or restorative care as ordered and as the patient tolerates     3. Laboratory evaluations will be monitored on an ongoing as needed basis     4. Medications have been cross-referenced with the patient's diagnoses list, and medications reductions have been considered and/or implemented.     5. Pharmacy recommendations are addressed on an ongoing as needed basis.     6. Controlled substances have been electronically scripted every 60 days for opiates and others of similar schedule, and every 6 months for sedative/hypnotics and others of similar schedule.     7. Nursing has been queried about any potential adverse events that need to be reported to me.    Salient information and adjustment of care plan pertaining to this individual patient interaction today are the following:      A. We will continue with restorative and  supportive care as the patient tolerates    B. Laboratory examinations will continue to be drawn on an ongoing as-needed basis. The patient's weight needs to be monitored and if needed we may need to institute appetite stimulating medication    C. The patient's long term prognosis is guarded    Charting is done using voice recognition software and may contain errors which may not have been completely corrected                     History of Present Illness       Progress note  DNR [facility]        This is a  long-term resident.      chronic abd pain     Patient suffers from schizoaffective disorder, major depression, bipolar disorder, anxiety disorder, paranoia with psychosis. She has had ECT in the past.        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, generalized weakness, impaired mobility, thrombocytopenia, vitamin D deficiency, hypothyroidism     MEDICATION  Facility protocol meds as needed, vitamin B12 1000 mcg daily, nystatin ointment, omeprazole 20 mg daily, vitamin D3 1000 units daily, clonazepam 0.5 mg 3 tablets twice daily, Synthroid 50 mcg daily, Zofran 4 mg every 6 hours as needed, trazodone 100 mg at bedtime, Sinemet  mg 3 times daily, Biotene dry mouth moisturizer, Lipitor 20 mg daily, folic acid 1 mg daily, Effexor  mg daily, valproic acid 250 mg twice daily, ax, artificial tears, Pyridium 200 mg every 12 hours as needed, Estrace 0.1 mg/g insert 0.1 g vaginally at bedtime for 14 days, Abilify 15 mg daily, Celexa 10 mg daily     SOCIAL/FAMILY HISTORY  Reviewed as previously documented      Review of Systems  All system review as allowed by patient condition and nursing input is negative        Active Problems  Problems    · Dementia with parkinsonism (331.82,294.10)  (G20,F02.80)   · Depression with anxiety (300.4) (F41.8)   · Family history of breast cancer (V16.3) (Z80.3)   · Fibrocystic breast (610.1) (N60.19)   · Frontal lobe deficit (799.55) (R41.844)   · Memory loss (780.93) (R41.3)   · Neurocognitive deficits (781.99) (R29.818,R41.89)   · Parkinsonism (332.0) (G20)   · Progressive supranuclear palsy (333.0) (G23.1)   · Schizoaffective disorder, mixed type (295.70) (F25.0)     Past Medical History  Problems    · History of anxiety (V11.8) (Z86.59)   · History of hypothyroidism (V12.29) (Z86.39)   · History of schizoaffective disorder (V11.0) (Z86.59)   · History of vitamin D deficiency (V12.1) (Z86.39)   · History of Polyp of vagina (623.7) (N84.2)   · History of Recurrent major depressive disorder, remission status unspecified (296.30)  (F33.9)   · History of Schizoaffective disorder, bipolar type without good prognostic features (295.70)  (F25.0)     Family History  Mother    · Family history of depression (V17.0) (Z81.8)  Father    · Family history of depression (V17.0) (Z81.8)  Sister    · Family history of breast cancer (V16.3) (Z80.3)  Brother    · Family history of mental disorder (V17.0) (Z81.8)  Maternal Aunt    · Family history of breast cancer (V16.3) (Z80.3)     Social History  Problems    · Born in [place]   · Completed college, bachelors degree   · General Sciences.   · Completed elementary school   · Completed graduate school, masters degree   · Criminal Justice Degree   · Completed high school   · Former smoker (V15.82) (Z87.891)   · Has no children   · Retired from employment   · Was a [employer]  from 1992 to 2009. Before that was [employer]      .   ·  (V61.07) (Z63.4)   ·  from 1989 to 2017 when her  passed away.     Allergies  Medication    · Penicillins   Recorded By: Jessica Felix; 1/16/2014 11:48:33 AM   · Tetracyclines   Recorded By: Jessica Felix; 1/16/2014 11:48:33 AM           Physical  Exam     Vital signs as per nursing/MA documentation  General appearance: Alert and in no acute distress  HEENT: Normal Inspection  Neck - Normal Inspectiopn  Respiratory : No respiratory distress. Lungs are clear   Cardiovascular: heart rate normal. No gallop  Back - normal inspection  Skin inspection:Warm  Musculoskeletal : No deformities  Neuro : Limited exam. Baseline  Psychiatric : Cooperative      Electronically Signed By: Tereso Acharya MD   9/17/24  4:44 PM

## 2024-09-16 NOTE — PROGRESS NOTES
Provider Impression      Patient is being seen for subsequent Medicare wellness and/or physical    Past Medical, Surgical and Family History: reviewed and updated in chart.   Medications and Supplements: Review of all medications by a prescribing practitioner or clinical pharmacist (such as prescriptions, OTCs, herbal therapies and supplements) documented in the medical record.    No, the patient is not using opioids.   Patient Self Assessment of Health Status: fair.   Tobacco use: Non-User The patient is a former cigarette smoker.   Alcohol use: As noted in social history   Illicit drug use: As noted in social history   Current diet: well balanced diet.   Exercise Frequency: the patient does not exercise.   Depression/Suicide Screening:  .   During the past 2 weeks, the patient has not felt down, depressed or hopeless.    During the past 2 weeks, the patient has not felt little interest or pleasure in doing things.    Hearing Impairment: Patient has slight hearing impairment.   Cognitive Impairment: Cognitive impairment was observed.      Bathing: dependent.   Dressing: dependent.   Walking: dependent.   Toileting: dependent.   Feeding: dependent.   Personal Hygiene: dependent.   Managing Finances: dependent.   Shopping: dependent.   Managing Medications: dependent.   Housework / Basic Home Maintenance: dependent.   Handling Transportation: dependent.   Preparing Meals: dependent.   Using the Telephone/ Communication Devices: dependent.    Falls Risk Screening:. Has not fallen in the last 6 months.   Home safety risk factors: none.   Advance directives:. Advanced Care Planning discussed and documented advance care plan or surrogate decision maker documented in the medical record.   A Conversation about Advance directives of at least 16 minutes has occurred. Actual time spent: 20   Topics discussed: Code status per nursing documentation filed with facility.           Amandam     1. Abdominal pain -chronic  2.  Chronic constipation. Continue with a bowel regimen. The patient does have bowel movements every day  3.Severe anxiety and depression managed by psych consultants. Patient is on number of psychotropic agents. Please see HPI for medication list  4. 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 Contributing to her constipation issues  5. Nutritional status is adequate with no concerns. Patient remains obese.  6. Progressive supranuclear ophthalmoplegia. Patient on valproic acid.  7. Skin remains intact without breakdown or decubiti  8. Hypothyroidism currently supplemented     This is a woman who has multiple medical problems including rather severe mental illness. This has resulted in significant self-care deficits. She requires assistance in all activities of daily living. We will work-up this abdominal pain and her complaints. Blood work and ultrasound imaging has been ordered. We will make further recommendations following review of these results.       1. medications are reviewed      2. Continue with rehabilitative, supportive, and or restorative care as ordered and as the patient tolerates     3. Laboratory evaluations will be monitored on an ongoing as needed basis     4. Medications have been cross-referenced with the patient's diagnoses list, and medications reductions have been considered and/or implemented.     5. Pharmacy recommendations are addressed on an ongoing as needed basis.     6. Controlled substances have been electronically scripted every 60 days for opiates and others of similar schedule, and every 6 months for sedative/hypnotics and others of similar schedule.     7. Nursing has been queried about any potential adverse events that need to be reported to me.    Salient information and adjustment of care plan pertaining to this individual patient interaction today are the following:      A. We will continue with restorative and supportive care as the patient tolerates    B. Laboratory  examinations will continue to be drawn on an ongoing as-needed basis. The patient's weight needs to be monitored and if needed we may need to institute appetite stimulating medication    C. The patient's long term prognosis is guarded    Charting is done using voice recognition software and may contain errors which may not have been completely corrected                     History of Present Illness       Progress note  DNR [facility]        This is a  long-term resident.      chronic abd pain     Patient suffers from schizoaffective disorder, major depression, bipolar disorder, anxiety disorder, paranoia with psychosis. She has had ECT in the past.        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, generalized weakness, impaired mobility, thrombocytopenia, vitamin D deficiency, hypothyroidism     MEDICATION  Facility protocol meds as needed, vitamin B12 1000 mcg daily, nystatin ointment, omeprazole 20 mg daily, vitamin D3 1000 units daily, clonazepam 0.5 mg 3 tablets twice daily, Synthroid 50 mcg daily, Zofran 4 mg every 6 hours as needed, trazodone 100 mg at bedtime, Sinemet  mg 3 times daily, Biotene dry mouth moisturizer, Lipitor 20 mg daily, folic acid 1 mg daily, Effexor  mg daily, valproic acid 250 mg twice daily, ax, artificial tears, Pyridium 200 mg every 12 hours as needed, Estrace 0.1 mg/g insert 0.1 g vaginally at bedtime for 14 days, Abilify 15 mg daily, Celexa 10 mg daily     SOCIAL/FAMILY HISTORY  Reviewed as previously documented      Review of Systems  All system review as allowed by patient condition and nursing input is negative        Active Problems  Problems    · Dementia with parkinsonism (331.82,294.10) (G20,F02.80)   · Depression with anxiety (300.4) (F41.8)   · Family  history of breast cancer (V16.3) (Z80.3)   · Fibrocystic breast (610.1) (N60.19)   · Frontal lobe deficit (799.55) (R41.844)   · Memory loss (780.93) (R41.3)   · Neurocognitive deficits (781.99) (R29.818,R41.89)   · Parkinsonism (332.0) (G20)   · Progressive supranuclear palsy (333.0) (G23.1)   · Schizoaffective disorder, mixed type (295.70) (F25.0)     Past Medical History  Problems    · History of anxiety (V11.8) (Z86.59)   · History of hypothyroidism (V12.29) (Z86.39)   · History of schizoaffective disorder (V11.0) (Z86.59)   · History of vitamin D deficiency (V12.1) (Z86.39)   · History of Polyp of vagina (623.7) (N84.2)   · History of Recurrent major depressive disorder, remission status unspecified (296.30)  (F33.9)   · History of Schizoaffective disorder, bipolar type without good prognostic features (295.70)  (F25.0)     Family History  Mother    · Family history of depression (V17.0) (Z81.8)  Father    · Family history of depression (V17.0) (Z81.8)  Sister    · Family history of breast cancer (V16.3) (Z80.3)  Brother    · Family history of mental disorder (V17.0) (Z81.8)  Maternal Aunt    · Family history of breast cancer (V16.3) (Z80.3)     Social History  Problems    · Born in [place]   · Completed college, bachelors degree   · General Sciences.   · Completed elementary school   · Completed graduate school, masters degree   · Criminal Justice Degree   · Completed high school   · Former smoker (V15.82) (Z87.891)   · Has no children   · Retired from employment   · Was a [employer]  from 1992 to 2009. Before that was [employer]      .   ·  (V61.07) (Z63.4)   ·  from 1989 to 2017 when her  passed away.     Allergies  Medication    · Penicillins   Recorded By: Jessica Felix; 1/16/2014 11:48:33 AM   · Tetracyclines   Recorded By: Jessica Felix; 1/16/2014 11:48:33 AM           Physical Exam     Vital signs as per nursing/MA documentation  General  appearance: Alert and in no acute distress  HEENT: Normal Inspection  Neck - Normal Inspectiopn  Respiratory : No respiratory distress. Lungs are clear   Cardiovascular: heart rate normal. No gallop  Back - normal inspection  Skin inspection:Warm  Musculoskeletal : No deformities  Neuro : Limited exam. Baseline  Psychiatric : Cooperative

## 2024-10-08 ENCOUNTER — NURSING HOME VISIT (OUTPATIENT)
Dept: POST ACUTE CARE | Facility: EXTERNAL LOCATION | Age: 69
End: 2024-10-08
Payer: MEDICARE

## 2024-10-08 DIAGNOSIS — E03.9 HYPOTHYROIDISM, UNSPECIFIED TYPE: Primary | ICD-10-CM

## 2024-10-08 DIAGNOSIS — E46 PROTEIN-CALORIE MALNUTRITION, UNSPECIFIED SEVERITY (MULTI): ICD-10-CM

## 2024-10-08 DIAGNOSIS — F31.9 BIPOLAR AFFECTIVE DISORDER, REMISSION STATUS UNSPECIFIED (MULTI): ICD-10-CM

## 2024-10-08 DIAGNOSIS — F29 ATYPICAL PSYCHOSIS (MULTI): ICD-10-CM

## 2024-10-08 PROCEDURE — 99308 SBSQ NF CARE LOW MDM 20: CPT | Performed by: EMERGENCY MEDICINE

## 2024-10-08 NOTE — LETTER
Patient: Daysi John  : 1955    Encounter Date: 10/08/2024    Provider Impression           Jonah     1. Abdominal pain -chronic  2. Chronic constipation. Continue with a bowel regimen. The patient does have bowel movements every day  3.Severe anxiety and depression managed by psych consultants. Patient is on number of psychotropic agents. Please see HPI for medication list  4. 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 Contributing to her constipation issues  5. Nutritional status is adequate with no concerns. Patient remains obese.  6. Progressive supranuclear ophthalmoplegia. Patient on valproic acid.  7. Skin remains intact without breakdown or decubiti  8. Hypothyroidism currently supplemented     This is a woman who has multiple medical problems including rather severe mental illness. This has resulted in significant self-care deficits. She requires assistance in all activities of daily living. We will work-up this abdominal pain and her complaints. Blood work and ultrasound imaging has been ordered. We will make further recommendations following review of these results.       This patient was seen for my regular monthly visit, nursing evaluations and nursing notes were reviewed, interim events are reviewed, interim concerns and messages were reviewed as we have communicated with nursing staff.  Any issues with the falls, skin care impairment, declining physical condition are reviewed and noted, diagnosis list were reviewed, list of medications were reviewed, living will related issues were reviewed, overall patient has been doing well, any declining in patient's condition or any change in patient's condition needs to be notified to physician promptly, discussed with nursing staff, if needed would communicate with family.  Patient stays confined here at the facility for long-term care, there are always concerns about long-term care related issues and concerns.  Nursing staff is trying their best  to keep patient safe, all sort of measures has been taken to keep patient safe and comfortable.                        History of Present Illness       Progress note  DNR [facility]        This is a  long-term resident.      chronic abd pain     Patient suffers from schizoaffective disorder, major depression, bipolar disorder, anxiety disorder, paranoia with psychosis. She has had ECT in the past.        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, generalized weakness, impaired mobility, thrombocytopenia, vitamin D deficiency, hypothyroidism     MEDICATION  Facility protocol meds as needed, vitamin B12 1000 mcg daily, nystatin ointment, omeprazole 20 mg daily, vitamin D3 1000 units daily, clonazepam 0.5 mg 3 tablets twice daily, Synthroid 50 mcg daily, Zofran 4 mg every 6 hours as needed, trazodone 100 mg at bedtime, Sinemet  mg 3 times daily, Biotene dry mouth moisturizer, Lipitor 20 mg daily, folic acid 1 mg daily, Effexor  mg daily, valproic acid 250 mg twice daily, ax, artificial tears, Pyridium 200 mg every 12 hours as needed, Estrace 0.1 mg/g insert 0.1 g vaginally at bedtime for 14 days, Abilify 15 mg daily, Celexa 10 mg daily     SOCIAL/FAMILY HISTORY  Reviewed as previously documented      Review of Systems  All system review as allowed by patient condition and nursing input is negative        Active Problems  Problems    · Dementia with parkinsonism (331.82,294.10) (G20,F02.80)   · Depression with anxiety (300.4) (F41.8)   · Family history of breast cancer (V16.3) (Z80.3)   · Fibrocystic breast (610.1) (N60.19)   · Frontal lobe deficit (799.55) (R41.844)   · Memory loss (780.93) (R41.3)   · Neurocognitive deficits (781.99) (R29.818,R41.89)   · Parkinsonism (332.0) (G20)   · Progressive  supranuclear palsy (333.0) (G23.1)   · Schizoaffective disorder, mixed type (295.70) (F25.0)     Past Medical History  Problems    · History of anxiety (V11.8) (Z86.59)   · History of hypothyroidism (V12.29) (Z86.39)   · History of schizoaffective disorder (V11.0) (Z86.59)   · History of vitamin D deficiency (V12.1) (Z86.39)   · History of Polyp of vagina (623.7) (N84.2)   · History of Recurrent major depressive disorder, remission status unspecified (296.30)  (F33.9)   · History of Schizoaffective disorder, bipolar type without good prognostic features (295.70)  (F25.0)     Family History  Mother    · Family history of depression (V17.0) (Z81.8)  Father    · Family history of depression (V17.0) (Z81.8)  Sister    · Family history of breast cancer (V16.3) (Z80.3)  Brother    · Family history of mental disorder (V17.0) (Z81.8)  Maternal Aunt    · Family history of breast cancer (V16.3) (Z80.3)     Social History  Problems    · Born in [place]   · Completed college, bachelors degree   · General Sciences.   · Completed elementary school   · Completed graduate school, masters degree   · Criminal Justice Degree   · Completed high school   · Former smoker (V15.82) (Z87.891)   · Has no children   · Retired from employment   · Was a [employer]  from 1992 to 2009. Before that was [employer]      .   ·  (V61.07) (Z63.4)   ·  from 1989 to 2017 when her  passed away.     Allergies  Medication    · Penicillins   Recorded By: Jessica Felix; 1/16/2014 11:48:33 AM   · Tetracyclines   Recorded By: Jessica Felix; 1/16/2014 11:48:33 AM           Physical Exam     Vital signs as per nursing/MA documentation  General appearance: Alert and in no acute distress  HEENT: Normal Inspection  Neck - Normal Inspectiopn  Respiratory : No respiratory distress. Lungs are clear   Cardiovascular: heart rate normal. No gallop  Back - normal inspection  Skin inspection:Warm  Musculoskeletal : No  deformities  Neuro : Limited exam. Baseline  Psychiatric : Cooperative      Electronically Signed By: Tereso Acharya MD   10/27/24  5:20 PM

## 2024-10-09 NOTE — PROGRESS NOTES
Provider Impression           Jonah     1. Abdominal pain -chronic  2. Chronic constipation. Continue with a bowel regimen. The patient does have bowel movements every day  3.Severe anxiety and depression managed by psych consultants. Patient is on number of psychotropic agents. Please see HPI for medication list  4. 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 Contributing to her constipation issues  5. Nutritional status is adequate with no concerns. Patient remains obese.  6. Progressive supranuclear ophthalmoplegia. Patient on valproic acid.  7. Skin remains intact without breakdown or decubiti  8. Hypothyroidism currently supplemented     This is a woman who has multiple medical problems including rather severe mental illness. This has resulted in significant self-care deficits. She requires assistance in all activities of daily living. We will work-up this abdominal pain and her complaints. Blood work and ultrasound imaging has been ordered. We will make further recommendations following review of these results.       This patient was seen for my regular monthly visit, nursing evaluations and nursing notes were reviewed, interim events are reviewed, interim concerns and messages were reviewed as we have communicated with nursing staff.  Any issues with the falls, skin care impairment, declining physical condition are reviewed and noted, diagnosis list were reviewed, list of medications were reviewed, living will related issues were reviewed, overall patient has been doing well, any declining in patient's condition or any change in patient's condition needs to be notified to physician promptly, discussed with nursing staff, if needed would communicate with family.  Patient stays confined here at the facility for long-term care, there are always concerns about long-term care related issues and concerns.  Nursing staff is trying their best to keep patient safe, all sort of measures has been taken to keep  patient safe and comfortable.                        History of Present Illness       Progress note  DNR CCA        This is a  long-term resident.      chronic abd pain     Patient suffers from schizoaffective disorder, major depression, bipolar disorder, anxiety disorder, paranoia with psychosis. She has had ECT in the past.        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, generalized weakness, impaired mobility, thrombocytopenia, vitamin D deficiency, hypothyroidism     MEDICATION  Facility protocol meds as needed, vitamin B12 1000 mcg daily, nystatin ointment, omeprazole 20 mg daily, vitamin D3 1000 units daily, clonazepam 0.5 mg 3 tablets twice daily, Synthroid 50 mcg daily, Zofran 4 mg every 6 hours as needed, trazodone 100 mg at bedtime, Sinemet  mg 3 times daily, Biotene dry mouth moisturizer, Lipitor 20 mg daily, folic acid 1 mg daily, Effexor  mg daily, valproic acid 250 mg twice daily, ax, artificial tears, Pyridium 200 mg every 12 hours as needed, Estrace 0.1 mg/g insert 0.1 g vaginally at bedtime for 14 days, Abilify 15 mg daily, Celexa 10 mg daily     SOCIAL/FAMILY HISTORY  Reviewed as previously documented      Review of Systems  All system review as allowed by patient condition and nursing input is negative        Active Problems  Problems    · Dementia with parkinsonism (331.82,294.10) (G20,F02.80)   · Depression with anxiety (300.4) (F41.8)   · Family history of breast cancer (V16.3) (Z80.3)   · Fibrocystic breast (610.1) (N60.19)   · Frontal lobe deficit (799.55) (R41.844)   · Memory loss (780.93) (R41.3)   · Neurocognitive deficits (781.99) (R29.818,R41.89)   · Parkinsonism (332.0) (G20)   · Progressive supranuclear palsy (333.0) (G23.1)   · Schizoaffective disorder,  mixed type (295.70) (F25.0)     Past Medical History  Problems    · History of anxiety (V11.8) (Z86.59)   · History of hypothyroidism (V12.29) (Z86.39)   · History of schizoaffective disorder (V11.0) (Z86.59)   · History of vitamin D deficiency (V12.1) (Z86.39)   · History of Polyp of vagina (623.7) (N84.2)   · History of Recurrent major depressive disorder, remission status unspecified (296.30)  (F33.9)   · History of Schizoaffective disorder, bipolar type without good prognostic features (295.70)  (F25.0)     Family History  Mother    · Family history of depression (V17.0) (Z81.8)  Father    · Family history of depression (V17.0) (Z81.8)  Sister    · Family history of breast cancer (V16.3) (Z80.3)  Brother    · Family history of mental disorder (V17.0) (Z81.8)  Maternal Aunt    · Family history of breast cancer (V16.3) (Z80.3)     Social History  Problems    · Born in [place]   · Completed college, bachelors degree   · General Sciences.   · Completed elementary school   · Completed graduate school, masters degree   · Criminal Justice Degree   · Completed high school   · Former smoker (V15.82) (Z87.891)   · Has no children   · Retired from employment   · Was a [employer]  from 1992 to 2009. Before that was [employer]      .   ·  (V61.07) (Z63.4)   ·  from 1989 to 2017 when her  passed away.     Allergies  Medication    · Penicillins   Recorded By: Jessica Felix; 1/16/2014 11:48:33 AM   · Tetracyclines   Recorded By: Jessica Felix; 1/16/2014 11:48:33 AM           Physical Exam     Vital signs as per nursing/MA documentation  General appearance: Alert and in no acute distress  HEENT: Normal Inspection  Neck - Normal Inspectiopn  Respiratory : No respiratory distress. Lungs are clear   Cardiovascular: heart rate normal. No gallop  Back - normal inspection  Skin inspection:Warm  Musculoskeletal : No deformities  Neuro : Limited exam. Baseline  Psychiatric :  Cooperative

## 2024-11-07 ENCOUNTER — NURSING HOME VISIT (OUTPATIENT)
Dept: POST ACUTE CARE | Facility: EXTERNAL LOCATION | Age: 69
End: 2024-11-07
Payer: MEDICARE

## 2024-11-07 DIAGNOSIS — E03.9 HYPOTHYROIDISM, UNSPECIFIED TYPE: Primary | ICD-10-CM

## 2024-11-07 DIAGNOSIS — F25.9 SCHIZOAFFECTIVE DISORDER, UNSPECIFIED TYPE: ICD-10-CM

## 2024-11-07 DIAGNOSIS — E46 PROTEIN-CALORIE MALNUTRITION, UNSPECIFIED SEVERITY (MULTI): ICD-10-CM

## 2024-11-07 DIAGNOSIS — F29 ATYPICAL PSYCHOSIS (MULTI): ICD-10-CM

## 2024-11-07 NOTE — LETTER
Patient: Daysi John  : 1955    Encounter Date: 2024    Provider Impression           Jonah     1. Abdominal pain -chronic  2. Chronic constipation. Continue with a bowel regimen. The patient does have bowel movements every day  3.Severe anxiety and depression managed by psych consultants. Patient is on number of psychotropic agents. Please see HPI for medication list  4. 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 Contributing to her constipation issues  5. Nutritional status is adequate with no concerns. Patient remains obese.  6. Progressive supranuclear ophthalmoplegia. Patient on valproic acid.  7. Skin remains intact without breakdown or decubiti  8. Hypothyroidism currently supplemented     This is a woman who has multiple medical problems including rather severe mental illness. This has resulted in significant self-care deficits. She requires assistance in all activities of daily living. We will work-up this abdominal pain and her complaints. Blood work and ultrasound imaging has been ordered. We will make further recommendations following review of these results.       -Fall prevention    -Cognitive engagement     -Monitor and treat blood pressure     -Aggressive decubitus ulcer prevention.     -Bowel and bladder care     -Optimal nutrition and supplementation as needed     -GI  and DVT prophylaxis     -Symptom control     -Ambulation as tolerated     -Will follow    Charting is done using voice recognition software and may contain errors which have not been completely corrected                         History of Present Illness       Progress note  DNR CCA        This is a  long-term resident.      chronic abd pain     Patient suffers from schizoaffective disorder, major depression, bipolar disorder, anxiety disorder, paranoia with psychosis. She has had ECT in the past.        [facility]  Schizoaffective disorder, major depression, bipolar disorder, anxiety disorder, paranoia with psychosis  having ECT therapy remotely, progressive supranuclear ophthalmoplegia, frequent falls, protein calorie malnutrition, self-care deficits, mixed a phase he, generalized weakness, impaired mobility, thrombocytopenia, vitamin D deficiency, hypothyroidism     MEDICATION  Facility protocol meds as needed, vitamin B12 1000 mcg daily, nystatin ointment, omeprazole 20 mg daily, vitamin D3 1000 units daily, clonazepam 0.5 mg 3 tablets twice daily, Synthroid 50 mcg daily, Zofran 4 mg every 6 hours as needed, trazodone 100 mg at bedtime, Sinemet  mg 3 times daily, Biotene dry mouth moisturizer, Lipitor 20 mg daily, folic acid 1 mg daily, Effexor  mg daily, valproic acid 250 mg twice daily, ax, artificial tears, Pyridium 200 mg every 12 hours as needed, Estrace 0.1 mg/g insert 0.1 g vaginally at bedtime for 14 days, Abilify 15 mg daily, Celexa 10 mg daily     SOCIAL/FAMILY HISTORY  Reviewed as previously documented      Review of Systems  All system review as allowed by patient condition and nursing input is negative        Active Problems  Problems    · Dementia with parkinsonism (331.82,294.10) (G20,F02.80)   · Depression with anxiety (300.4) (F41.8)   · Family history of breast cancer (V16.3) (Z80.3)   · Fibrocystic breast (610.1) (N60.19)   · Frontal lobe deficit (799.55) (R41.844)   · Memory loss (780.93) (R41.3)   · Neurocognitive deficits (781.99) (R29.818,R41.89)   · Parkinsonism (332.0) (G20)   · Progressive supranuclear palsy (333.0) (G23.1)   · Schizoaffective disorder, mixed type (295.70) (F25.0)     Past Medical History  Problems    · History of anxiety (V11.8) (Z86.59)   · History of hypothyroidism (V12.29) (Z86.39)   · History of schizoaffective disorder (V11.0) (Z86.59)   · History of vitamin D deficiency (V12.1) (Z86.39)   · History of Polyp of vagina (623.7) (N84.2)   · History of Recurrent major depressive disorder, remission status unspecified (296.30)  (F33.9)   · History of Schizoaffective  disorder, bipolar type without good prognostic features (295.70)  (F25.0)     Family History  Mother    · Family history of depression (V17.0) (Z81.8)  Father    · Family history of depression (V17.0) (Z81.8)  Sister    · Family history of breast cancer (V16.3) (Z80.3)  Brother    · Family history of mental disorder (V17.0) (Z81.8)  Maternal Aunt    · Family history of breast cancer (V16.3) (Z80.3)     Social History  Problems    · Born in [place]   · Completed college, bachelors degree   · General Sciences.   · Completed elementary school   · Completed graduate school, masters degree   · Criminal Justice Degree   · Completed high school   · Former smoker (V15.82) (Z87.891)   · Has no children   · Retired from employment   · Was a [employer]  from 1992 to 2009. Before that was [employer]      .   ·  (V61.07) (Z63.4)   ·  from 1989 to 2017 when her  passed away.     Allergies  Medication    · Penicillins   Recorded By: Jessica Felix; 1/16/2014 11:48:33 AM   · Tetracyclines   Recorded By: Jessica Felix; 1/16/2014 11:48:33 AM           Physical Exam     Vital signs as per nursing/MA documentation  General appearance: Alert and in no acute distress  HEENT: Normal Inspection  Neck - Normal Inspectiopn  Respiratory : No respiratory distress. Lungs are clear   Cardiovascular: heart rate normal. No gallop  Back - normal inspection  Skin inspection:Warm  Musculoskeletal : No deformities  Neuro : Limited exam. Baseline  Psychiatric : Cooperative      Electronically Signed By: Tereso Acharya MD   11/16/24  9:47 AM

## 2024-11-16 NOTE — PROGRESS NOTES
Provider Impression           Jonah     1. Abdominal pain -chronic  2. Chronic constipation. Continue with a bowel regimen. The patient does have bowel movements every day  3.Severe anxiety and depression managed by psych consultants. Patient is on number of psychotropic agents. Please see HPI for medication list  4. 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 Contributing to her constipation issues  5. Nutritional status is adequate with no concerns. Patient remains obese.  6. Progressive supranuclear ophthalmoplegia. Patient on valproic acid.  7. Skin remains intact without breakdown or decubiti  8. Hypothyroidism currently supplemented     This is a woman who has multiple medical problems including rather severe mental illness. This has resulted in significant self-care deficits. She requires assistance in all activities of daily living. We will work-up this abdominal pain and her complaints. Blood work and ultrasound imaging has been ordered. We will make further recommendations following review of these results.       -Fall prevention    -Cognitive engagement     -Monitor and treat blood pressure     -Aggressive decubitus ulcer prevention.     -Bowel and bladder care     -Optimal nutrition and supplementation as needed     -GI  and DVT prophylaxis     -Symptom control     -Ambulation as tolerated     -Will follow    Charting is done using voice recognition software and may contain errors which have not been completely corrected                         History of Present Illness       Progress note  DNR CCA        This is a  long-term resident.      chronic abd pain     Patient suffers from schizoaffective disorder, major depression, bipolar disorder, anxiety disorder, paranoia with psychosis. She has had ECT in the past.        [facility]  Schizoaffective disorder, major depression, bipolar disorder, anxiety disorder, paranoia with psychosis having ECT therapy remotely, progressive supranuclear ophthalmoplegia,  frequent falls, protein calorie malnutrition, self-care deficits, mixed a phase he, generalized weakness, impaired mobility, thrombocytopenia, vitamin D deficiency, hypothyroidism     MEDICATION  Facility protocol meds as needed, vitamin B12 1000 mcg daily, nystatin ointment, omeprazole 20 mg daily, vitamin D3 1000 units daily, clonazepam 0.5 mg 3 tablets twice daily, Synthroid 50 mcg daily, Zofran 4 mg every 6 hours as needed, trazodone 100 mg at bedtime, Sinemet  mg 3 times daily, Biotene dry mouth moisturizer, Lipitor 20 mg daily, folic acid 1 mg daily, Effexor  mg daily, valproic acid 250 mg twice daily, ax, artificial tears, Pyridium 200 mg every 12 hours as needed, Estrace 0.1 mg/g insert 0.1 g vaginally at bedtime for 14 days, Abilify 15 mg daily, Celexa 10 mg daily     SOCIAL/FAMILY HISTORY  Reviewed as previously documented      Review of Systems  All system review as allowed by patient condition and nursing input is negative        Active Problems  Problems    · Dementia with parkinsonism (331.82,294.10) (G20,F02.80)   · Depression with anxiety (300.4) (F41.8)   · Family history of breast cancer (V16.3) (Z80.3)   · Fibrocystic breast (610.1) (N60.19)   · Frontal lobe deficit (799.55) (R41.844)   · Memory loss (780.93) (R41.3)   · Neurocognitive deficits (781.99) (R29.818,R41.89)   · Parkinsonism (332.0) (G20)   · Progressive supranuclear palsy (333.0) (G23.1)   · Schizoaffective disorder, mixed type (295.70) (F25.0)     Past Medical History  Problems    · History of anxiety (V11.8) (Z86.59)   · History of hypothyroidism (V12.29) (Z86.39)   · History of schizoaffective disorder (V11.0) (Z86.59)   · History of vitamin D deficiency (V12.1) (Z86.39)   · History of Polyp of vagina (623.7) (N84.2)   · History of Recurrent major depressive disorder, remission status unspecified (296.30)  (F33.9)   · History of Schizoaffective disorder, bipolar type without good prognostic features (295.70)  (F25.0)      Family History  Mother    · Family history of depression (V17.0) (Z81.8)  Father    · Family history of depression (V17.0) (Z81.8)  Sister    · Family history of breast cancer (V16.3) (Z80.3)  Brother    · Family history of mental disorder (V17.0) (Z81.8)  Maternal Aunt    · Family history of breast cancer (V16.3) (Z80.3)     Social History  Problems    · Born in [place]   · Completed college, bachelors degree   · General Sciences.   · Completed elementary school   · Completed graduate school, masters degree   · Criminal Justice Degree   · Completed high school   · Former smoker (V15.82) (Z87.891)   · Has no children   · Retired from employment   · Was a [employer] [facility]  from 1992 to 2009. Before that was [employer]      .   ·  (V61.07) (Z63.4)   ·  from 1989 to 2017 when her  passed away.     Allergies  Medication    · Penicillins   Recorded By: Jessica Felix; 1/16/2014 11:48:33 AM   · Tetracyclines   Recorded By: Jessica Felix; 1/16/2014 11:48:33 AM           Physical Exam     Vital signs as per nursing/MA documentation  General appearance: Alert and in no acute distress  HEENT: Normal Inspection  Neck - Normal Inspectiopn  Respiratory : No respiratory distress. Lungs are clear   Cardiovascular: heart rate normal. No gallop  Back - normal inspection  Skin inspection:Warm  Musculoskeletal : No deformities  Neuro : Limited exam. Baseline  Psychiatric : Cooperative

## 2025-01-14 ENCOUNTER — NURSING HOME VISIT (OUTPATIENT)
Dept: POST ACUTE CARE | Facility: EXTERNAL LOCATION | Age: 70
End: 2025-01-14
Payer: MEDICARE

## 2025-01-14 DIAGNOSIS — F29 ATYPICAL PSYCHOSIS (MULTI): ICD-10-CM

## 2025-01-14 DIAGNOSIS — F25.9 SCHIZOAFFECTIVE DISORDER, UNSPECIFIED TYPE: Primary | ICD-10-CM

## 2025-01-14 DIAGNOSIS — F31.9 BIPOLAR AFFECTIVE DISORDER, REMISSION STATUS UNSPECIFIED (MULTI): ICD-10-CM

## 2025-01-14 DIAGNOSIS — E03.9 HYPOTHYROIDISM, UNSPECIFIED TYPE: ICD-10-CM

## 2025-01-14 PROCEDURE — 99309 SBSQ NF CARE MODERATE MDM 30: CPT | Performed by: EMERGENCY MEDICINE

## 2025-01-14 NOTE — LETTER
Patient: Daysi John  : 1955    Encounter Date: 2025    Provider Impression           Jonah     1. Abdominal pain -chronic  2. Chronic constipation. Continue with a bowel regimen. The patient does have bowel movements every day  3.Severe anxiety and depression managed by psych consultants. Patient is on number of psychotropic agents. Please see HPI for medication list  4. 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 Contributing to her constipation issues  5. Nutritional status is adequate with no concerns. Patient remains obese.  6. Progressive supranuclear ophthalmoplegia. Patient on valproic acid.  7. Skin remains intact without breakdown or decubiti  8. Hypothyroidism currently supplemented     This is a woman who has multiple medical problems including rather severe mental illness. This has resulted in significant self-care deficits. She requires assistance in all activities of daily living. We will work-up this abdominal pain and her complaints. Blood work and ultrasound imaging has been ordered. We will make further recommendations following review of these results.       Provide safe environment for the patient    Continue current medication regimen    OT PT and speech therapy    Bowel and bladder,skin care    Nutritional support    Monitor and treat blood glucose    GI  and DVT prophylaxis    PRN medications    Periodic lab work    Regular Follow up                           History of Present Illness       Progress note  DNR CCA        This is a  long-term resident.      chronic abd pain     Patient suffers from schizoaffective disorder, major depression, bipolar disorder, anxiety disorder, paranoia with psychosis. She has had ECT in the past.        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, generalized weakness, impaired mobility, thrombocytopenia, vitamin D deficiency, hypothyroidism     MEDICATION  Facility protocol meds as needed, vitamin B12 1000 mcg daily, nystatin ointment, omeprazole 20 mg daily, vitamin D3 1000 units daily, clonazepam 0.5 mg 3 tablets twice daily, Synthroid 50 mcg daily, Zofran 4 mg every 6 hours as needed, trazodone 100 mg at bedtime, Sinemet  mg 3 times daily, Biotene dry mouth moisturizer, Lipitor 20 mg daily, folic acid 1 mg daily, Effexor  mg daily, valproic acid 250 mg twice daily, ax, artificial tears, Pyridium 200 mg every 12 hours as needed, Estrace 0.1 mg/g insert 0.1 g vaginally at bedtime for 14 days, Abilify 15 mg daily, Celexa 10 mg daily     SOCIAL/FAMILY HISTORY  Reviewed as previously documented      Review of Systems  All system review as allowed by patient condition and nursing input is negative        Active Problems  Problems    · Dementia with parkinsonism (331.82,294.10) (G20,F02.80)   · Depression with anxiety (300.4) (F41.8)   · Family history of breast cancer (V16.3) (Z80.3)   · Fibrocystic breast (610.1) (N60.19)   · Frontal lobe deficit (799.55) (R41.844)   · Memory loss (780.93) (R41.3)   · Neurocognitive deficits (781.99) (R29.818,R41.89)   · Parkinsonism (332.0) (G20)   · Progressive supranuclear palsy (333.0) (G23.1)   · Schizoaffective disorder, mixed type (295.70) (F25.0)     Past Medical History  Problems    · History of anxiety (V11.8) (Z86.59)   · History of hypothyroidism (V12.29) (Z86.39)   · History of schizoaffective disorder (V11.0) (Z86.59)   · History of vitamin D deficiency (V12.1) (Z86.39)   · History of Polyp of vagina (623.7) (N84.2)   · History of Recurrent major depressive disorder, remission status unspecified (296.30)  (F33.9)   · History of Schizoaffective disorder, bipolar type without good prognostic features (295.70)  (F25.0)     Family History  Mother    · Family history of  depression (V17.0) (Z81.8)  Father    · Family history of depression (V17.0) (Z81.8)  Sister    · Family history of breast cancer (V16.3) (Z80.3)  Brother    · Family history of mental disorder (V17.0) (Z81.8)  Maternal Aunt    · Family history of breast cancer (V16.3) (Z80.3)     Social History  Problems    · Born in [place]   · Completed college, bachelors degree   · General Sciences.   · Completed elementary school   · Completed graduate school, masters degree   · Criminal Justice Degree   · Completed high school   · Former smoker (V15.82) (Z87.891)   · Has no children   · Retired from employment   · Was a [employer] [facility]  from 1992 to 2009. Before that was [employer]      .   ·  (V61.07) (Z63.4)   ·  from 1989 to 2017 when her  passed away.     Allergies  Medication    · Penicillins   Recorded By: Jessica Felix; 1/16/2014 11:48:33 AM   · Tetracyclines   Recorded By: Jessica Felix; 1/16/2014 11:48:33 AM           Physical Exam     Vital signs as per nursing/MA documentation  General appearance: Alert and in no acute distress  HEENT: Normal Inspection  Neck - Normal Inspectiopn  Respiratory : No respiratory distress. Lungs are clear   Cardiovascular: heart rate normal. No gallop  Back - normal inspection  Skin inspection:Warm  Musculoskeletal : No deformities  Neuro : Limited exam. Baseline  Psychiatric : Cooperative      Electronically Signed By: Tereso Acharya MD   1/16/25  4:49 PM

## 2025-01-15 NOTE — PROGRESS NOTES
Provider Impression           Jonah     1. Abdominal pain -chronic  2. Chronic constipation. Continue with a bowel regimen. The patient does have bowel movements every day  3.Severe anxiety and depression managed by psych consultants. Patient is on number of psychotropic agents. Please see HPI for medication list  4. 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 Contributing to her constipation issues  5. Nutritional status is adequate with no concerns. Patient remains obese.  6. Progressive supranuclear ophthalmoplegia. Patient on valproic acid.  7. Skin remains intact without breakdown or decubiti  8. Hypothyroidism currently supplemented     This is a woman who has multiple medical problems including rather severe mental illness. This has resulted in significant self-care deficits. She requires assistance in all activities of daily living. We will work-up this abdominal pain and her complaints. Blood work and ultrasound imaging has been ordered. We will make further recommendations following review of these results.       Provide safe environment for the patient    Continue current medication regimen    OT PT and speech therapy    Bowel and bladder,skin care    Nutritional support    Monitor and treat blood glucose    GI  and DVT prophylaxis    PRN medications    Periodic lab work    Regular Follow up                           History of Present Illness       Progress note  DNR CCA        This is a  long-term resident.      chronic abd pain     Patient suffers from schizoaffective disorder, major depression, bipolar disorder, anxiety disorder, paranoia with psychosis. She has had ECT in the past.        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, generalized weakness, impaired mobility,  thrombocytopenia, vitamin D deficiency, hypothyroidism     MEDICATION  Facility protocol meds as needed, vitamin B12 1000 mcg daily, nystatin ointment, omeprazole 20 mg daily, vitamin D3 1000 units daily, clonazepam 0.5 mg 3 tablets twice daily, Synthroid 50 mcg daily, Zofran 4 mg every 6 hours as needed, trazodone 100 mg at bedtime, Sinemet  mg 3 times daily, Biotene dry mouth moisturizer, Lipitor 20 mg daily, folic acid 1 mg daily, Effexor  mg daily, valproic acid 250 mg twice daily, ax, artificial tears, Pyridium 200 mg every 12 hours as needed, Estrace 0.1 mg/g insert 0.1 g vaginally at bedtime for 14 days, Abilify 15 mg daily, Celexa 10 mg daily     SOCIAL/FAMILY HISTORY  Reviewed as previously documented      Review of Systems  All system review as allowed by patient condition and nursing input is negative        Active Problems  Problems    · Dementia with parkinsonism (331.82,294.10) (G20,F02.80)   · Depression with anxiety (300.4) (F41.8)   · Family history of breast cancer (V16.3) (Z80.3)   · Fibrocystic breast (610.1) (N60.19)   · Frontal lobe deficit (799.55) (R41.844)   · Memory loss (780.93) (R41.3)   · Neurocognitive deficits (781.99) (R29.818,R41.89)   · Parkinsonism (332.0) (G20)   · Progressive supranuclear palsy (333.0) (G23.1)   · Schizoaffective disorder, mixed type (295.70) (F25.0)     Past Medical History  Problems    · History of anxiety (V11.8) (Z86.59)   · History of hypothyroidism (V12.29) (Z86.39)   · History of schizoaffective disorder (V11.0) (Z86.59)   · History of vitamin D deficiency (V12.1) (Z86.39)   · History of Polyp of vagina (623.7) (N84.2)   · History of Recurrent major depressive disorder, remission status unspecified (296.30)  (F33.9)   · History of Schizoaffective disorder, bipolar type without good prognostic features (295.70)  (F25.0)     Family History  Mother    · Family history of depression (V17.0) (Z81.8)  Father    · Family history of depression (V17.0)  (Z81.8)  Sister    · Family history of breast cancer (V16.3) (Z80.3)  Brother    · Family history of mental disorder (V17.0) (Z81.8)  Maternal Aunt    · Family history of breast cancer (V16.3) (Z80.3)     Social History  Problems    · Born in [place]   · Completed college, bachelors degree   · General Sciences.   · Completed elementary school   · Completed graduate school, masters degree   · Criminal Justice Degree   · Completed high school   · Former smoker (V15.82) (Z87.891)   · Has no children   · Retired from employment   · Was a [employer]  from 1992 to 2009. Before that was [employer]      .   ·  (V61.07) (Z63.4)   ·  from 1989 to 2017 when her  passed away.     Allergies  Medication    · Penicillins   Recorded By: Jessica Felix; 1/16/2014 11:48:33 AM   · Tetracyclines   Recorded By: Jessica Felix; 1/16/2014 11:48:33 AM           Physical Exam     Vital signs as per nursing/MA documentation  General appearance: Alert and in no acute distress  HEENT: Normal Inspection  Neck - Normal Inspectiopn  Respiratory : No respiratory distress. Lungs are clear   Cardiovascular: heart rate normal. No gallop  Back - normal inspection  Skin inspection:Warm  Musculoskeletal : No deformities  Neuro : Limited exam. Baseline  Psychiatric : Cooperative

## 2025-02-04 ENCOUNTER — NURSING HOME VISIT (OUTPATIENT)
Dept: POST ACUTE CARE | Facility: EXTERNAL LOCATION | Age: 70
End: 2025-02-04
Payer: MEDICARE

## 2025-02-04 DIAGNOSIS — F31.9 BIPOLAR AFFECTIVE DISORDER, REMISSION STATUS UNSPECIFIED (MULTI): ICD-10-CM

## 2025-02-04 DIAGNOSIS — E03.9 HYPOTHYROIDISM, UNSPECIFIED TYPE: ICD-10-CM

## 2025-02-04 DIAGNOSIS — F25.9 SCHIZOAFFECTIVE DISORDER, UNSPECIFIED TYPE: Primary | ICD-10-CM

## 2025-02-04 DIAGNOSIS — F29 ATYPICAL PSYCHOSIS (MULTI): ICD-10-CM

## 2025-02-04 NOTE — LETTER
Patient: Daysi John  : 1955    Encounter Date: 2025    Provider Impression           Jonah     1. Abdominal pain -chronic  2. Chronic constipation. Continue with a bowel regimen. The patient does have bowel movements every day  3.Severe anxiety and depression managed by psych consultants. Patient is on number of psychotropic agents. Please see HPI for medication list  4. 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 Contributing to her constipation issues  5. Nutritional status is adequate with no concerns. Patient remains obese.  6. Progressive supranuclear ophthalmoplegia. Patient on valproic acid.  7. Skin remains intact without breakdown or decubiti  8. Hypothyroidism currently supplemented     This is a woman who has multiple medical problems including rather severe mental illness. This has resulted in significant self-care deficits. She requires assistance in all activities of daily living. We will work-up this abdominal pain and her complaints. Blood work and ultrasound imaging has been ordered. We will make further recommendations following review of these results.       Rx list reviewed.   PT and OT evaluation is in the process.   Routine safety measures, fall precautions, risk modification and alarm placement if needed for prevention of falls.   Skin care precautions, prevention of pressures sores at pressure points assessed.   Pt needs to be monitored frequently by nursing staff particularly at night time.   Any confusion, agitation or behavioural disturbance needs to be attended, as per home policy   rapid covid Ag assay need to be done, notify if positive.   If needed appropriate measures to be taken for alarm placements and assisted devices, pt was told not to get up and ambulate at night unless help and assist available at bedside,   labs will be done as per our routine protocol.   PO intake need to be monitored if consuming po.       Charting is done using voice recognition software  and may contain errors which have not been completely corrected                   History of Present Illness       Progress note  DNR CCA        This is a  long-term resident.      chronic abd pain     Patient suffers from schizoaffective disorder, major depression, bipolar disorder, anxiety disorder, paranoia with psychosis. She has had ECT in the past.        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, generalized weakness, impaired mobility, thrombocytopenia, vitamin D deficiency, hypothyroidism     MEDICATION  Facility protocol meds as needed, vitamin B12 1000 mcg daily, nystatin ointment, omeprazole 20 mg daily, vitamin D3 1000 units daily, clonazepam 0.5 mg 3 tablets twice daily, Synthroid 50 mcg daily, Zofran 4 mg every 6 hours as needed, trazodone 100 mg at bedtime, Sinemet  mg 3 times daily, Biotene dry mouth moisturizer, Lipitor 20 mg daily, folic acid 1 mg daily, Effexor  mg daily, valproic acid 250 mg twice daily, ax, artificial tears, Pyridium 200 mg every 12 hours as needed, Estrace 0.1 mg/g insert 0.1 g vaginally at bedtime for 14 days, Abilify 15 mg daily, Celexa 10 mg daily     SOCIAL/FAMILY HISTORY  Reviewed as previously documented      Review of Systems  All system review as allowed by patient condition and nursing input is negative        Active Problems  Problems    · Dementia with parkinsonism (331.82,294.10) (G20,F02.80)   · Depression with anxiety (300.4) (F41.8)   · Family history of breast cancer (V16.3) (Z80.3)   · Fibrocystic breast (610.1) (N60.19)   · Frontal lobe deficit (799.55) (R41.844)   · Memory loss (780.93) (R41.3)   · Neurocognitive deficits (781.99) (R29.818,R41.89)   · Parkinsonism (332.0) (G20)   · Progressive supranuclear palsy (333.0) (G23.1)    · Schizoaffective disorder, mixed type (295.70) (F25.0)     Past Medical History  Problems    · History of anxiety (V11.8) (Z86.59)   · History of hypothyroidism (V12.29) (Z86.39)   · History of schizoaffective disorder (V11.0) (Z86.59)   · History of vitamin D deficiency (V12.1) (Z86.39)   · History of Polyp of vagina (623.7) (N84.2)   · History of Recurrent major depressive disorder, remission status unspecified (296.30)  (F33.9)   · History of Schizoaffective disorder, bipolar type without good prognostic features (295.70)  (F25.0)     Family History  Mother    · Family history of depression (V17.0) (Z81.8)  Father    · Family history of depression (V17.0) (Z81.8)  Sister    · Family history of breast cancer (V16.3) (Z80.3)  Brother    · Family history of mental disorder (V17.0) (Z81.8)  Maternal Aunt    · Family history of breast cancer (V16.3) (Z80.3)     Social History  Problems    · Born in [place]   · Completed college, bachelors degree   · General Sciences.   · Completed elementary school   · Completed graduate school, masters degree   · Criminal Justice Degree   · Completed high school   · Former smoker (V15.82) (Z87.891)   · Has no children   · Retired from employment   · Was a [facility] [employer] [facility]note 3]  from 1992 to 2009. Before that was [employer]      .   ·  (V61.07) (Z63.4)   ·  from 1989 to 2017 when her  passed away.     Allergies  Medication    · Penicillins   Recorded By: Jessica Felix; 1/16/2014 11:48:33 AM   · Tetracyclines   Recorded By: Jessica Fleix; 1/16/2014 11:48:33 AM           Physical Exam     Vital signs as per nursing/MA documentation  General appearance: Alert and in no acute distress  HEENT: Normal Inspection  Neck - Normal Inspectiopn  Respiratory : No respiratory distress. Lungs are clear   Cardiovascular: heart rate normal. No gallop  Back - normal inspection  Skin inspection:Warm  Musculoskeletal : No deformities  Neuro : Limited exam.  Baseline  Psychiatric : Cooperative      Electronically Signed By: Tereso Acharya MD   2/8/25  8:16 AM

## 2025-02-07 NOTE — PROGRESS NOTES
Provider Impression           Jonah     1. Abdominal pain -chronic  2. Chronic constipation. Continue with a bowel regimen. The patient does have bowel movements every day  3.Severe anxiety and depression managed by psych consultants. Patient is on number of psychotropic agents. Please see HPI for medication list  4. 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 Contributing to her constipation issues  5. Nutritional status is adequate with no concerns. Patient remains obese.  6. Progressive supranuclear ophthalmoplegia. Patient on valproic acid.  7. Skin remains intact without breakdown or decubiti  8. Hypothyroidism currently supplemented     This is a woman who has multiple medical problems including rather severe mental illness. This has resulted in significant self-care deficits. She requires assistance in all activities of daily living. We will work-up this abdominal pain and her complaints. Blood work and ultrasound imaging has been ordered. We will make further recommendations following review of these results.       Rx list reviewed.   PT and OT evaluation is in the process.   Routine safety measures, fall precautions, risk modification and alarm placement if needed for prevention of falls.   Skin care precautions, prevention of pressures sores at pressure points assessed.   Pt needs to be monitored frequently by nursing staff particularly at night time.   Any confusion, agitation or behavioural disturbance needs to be attended, as per home policy   rapid covid Ag assay need to be done, notify if positive.   If needed appropriate measures to be taken for alarm placements and assisted devices, pt was told not to get up and ambulate at night unless help and assist available at bedside,   labs will be done as per our routine protocol.   PO intake need to be monitored if consuming po.       Charting is done using voice recognition software and may contain errors which have not been completely  corrected                   History of Present Illness       Progress note  DNR CCA        This is a  long-term resident.      chronic abd pain     Patient suffers from schizoaffective disorder, major depression, bipolar disorder, anxiety disorder, paranoia with psychosis. She has had ECT in the past.        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, generalized weakness, impaired mobility, thrombocytopenia, vitamin D deficiency, hypothyroidism     MEDICATION  Facility protocol meds as needed, vitamin B12 1000 mcg daily, nystatin ointment, omeprazole 20 mg daily, vitamin D3 1000 units daily, clonazepam 0.5 mg 3 tablets twice daily, Synthroid 50 mcg daily, Zofran 4 mg every 6 hours as needed, trazodone 100 mg at bedtime, Sinemet  mg 3 times daily, Biotene dry mouth moisturizer, Lipitor 20 mg daily, folic acid 1 mg daily, Effexor  mg daily, valproic acid 250 mg twice daily, ax, artificial tears, Pyridium 200 mg every 12 hours as needed, Estrace 0.1 mg/g insert 0.1 g vaginally at bedtime for 14 days, Abilify 15 mg daily, Celexa 10 mg daily     SOCIAL/FAMILY HISTORY  Reviewed as previously documented      Review of Systems  All system review as allowed by patient condition and nursing input is negative        Active Problems  Problems    · Dementia with parkinsonism (331.82,294.10) (G20,F02.80)   · Depression with anxiety (300.4) (F41.8)   · Family history of breast cancer (V16.3) (Z80.3)   · Fibrocystic breast (610.1) (N60.19)   · Frontal lobe deficit (799.55) (R41.844)   · Memory loss (780.93) (R41.3)   · Neurocognitive deficits (781.99) (R29.818,R41.89)   · Parkinsonism (332.0) (G20)   · Progressive supranuclear palsy (333.0) (G23.1)   · Schizoaffective disorder, mixed type (295.70)  (F25.0)     Past Medical History  Problems    · History of anxiety (V11.8) (Z86.59)   · History of hypothyroidism (V12.29) (Z86.39)   · History of schizoaffective disorder (V11.0) (Z86.59)   · History of vitamin D deficiency (V12.1) (Z86.39)   · History of Polyp of vagina (623.7) (N84.2)   · History of Recurrent major depressive disorder, remission status unspecified (296.30)  (F33.9)   · History of Schizoaffective disorder, bipolar type without good prognostic features (295.70)  (F25.0)     Family History  Mother    · Family history of depression (V17.0) (Z81.8)  Father    · Family history of depression (V17.0) (Z81.8)  Sister    · Family history of breast cancer (V16.3) (Z80.3)  Brother    · Family history of mental disorder (V17.0) (Z81.8)  Maternal Aunt    · Family history of breast cancer (V16.3) (Z80.3)     Social History  Problems    · Born in [place]   · Completed college, bachelors degree   · General Sciences.   · Completed elementary school   · Completed graduate school, masters degree   · Criminal Justice Degree   · Completed high school   · Former smoker (V15.82) (Z87.891)   · Has no children   · Retired from employment   · Was a [facility] [facility]  from 1992 to 2009. Before that was [employer]      .   ·  (V61.07) (Z63.4)   ·  from 1989 to 2017 when her  passed away.     Allergies  Medication    · Penicillins   Recorded By: Jessica Felix; 1/16/2014 11:48:33 AM   · Tetracyclines   Recorded By: Jessica Felix; 1/16/2014 11:48:33 AM           Physical Exam     Vital signs as per nursing/MA documentation  General appearance: Alert and in no acute distress  HEENT: Normal Inspection  Neck - Normal Inspectiopn  Respiratory : No respiratory distress. Lungs are clear   Cardiovascular: heart rate normal. No gallop  Back - normal inspection  Skin inspection:Warm  Musculoskeletal : No deformities  Neuro : Limited exam. Baseline  Psychiatric : Cooperative

## 2025-03-13 ENCOUNTER — NURSING HOME VISIT (OUTPATIENT)
Dept: POST ACUTE CARE | Facility: EXTERNAL LOCATION | Age: 70
End: 2025-03-13
Payer: MEDICARE

## 2025-03-13 DIAGNOSIS — F31.9 BIPOLAR AFFECTIVE DISORDER, REMISSION STATUS UNSPECIFIED (MULTI): ICD-10-CM

## 2025-03-13 DIAGNOSIS — E46 PROTEIN-CALORIE MALNUTRITION, UNSPECIFIED SEVERITY (MULTI): Primary | ICD-10-CM

## 2025-03-13 DIAGNOSIS — E03.9 HYPOTHYROIDISM, UNSPECIFIED TYPE: ICD-10-CM

## 2025-03-13 DIAGNOSIS — F25.9 SCHIZOAFFECTIVE DISORDER, UNSPECIFIED TYPE: ICD-10-CM

## 2025-03-13 DIAGNOSIS — F29 ATYPICAL PSYCHOSIS (MULTI): ICD-10-CM

## 2025-03-13 PROCEDURE — 99309 SBSQ NF CARE MODERATE MDM 30: CPT | Performed by: EMERGENCY MEDICINE

## 2025-03-13 NOTE — LETTER
Patient: Daysi John  : 1955    Encounter Date: 2025    Provider Impression           Jonah     1. Abdominal pain -chronic  2. Chronic constipation. Continue with a bowel regimen. The patient does have bowel movements every day  3.Severe anxiety and depression managed by psych consultants. Patient is on number of psychotropic agents. Please see HPI for medication list  4. 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 Contributing to her constipation issues  5. Nutritional status is adequate with no concerns. Patient remains obese.  6. Progressive supranuclear ophthalmoplegia. Patient on valproic acid.  7. Skin remains intact without breakdown or decubiti  8. Hypothyroidism currently supplemented     This is a woman who has multiple medical problems including rather severe mental illness. This has resulted in significant self-care deficits. She requires assistance in all activities of daily living. We will work-up this abdominal pain and her complaints. Blood work and ultrasound imaging has been ordered. We will make further recommendations following review of these results.       1. medications are reviewed      2. Continue with rehabilitative, supportive, and or restorative care as ordered and as the patient tolerates     3. Laboratory evaluations will be monitored on an ongoing as needed basis     4. Medications have been cross-referenced with the patient's diagnoses list, and medications reductions have been considered and/or implemented.     5. Pharmacy recommendations are addressed on an ongoing as needed basis.     6. Controlled substances have been electronically scripted every 60 days for opiates and others of similar schedule, and every 6 months for sedative/hypnotics and others of similar schedule.     7. Nursing has been queried about any potential adverse events that need to be reported to me.    Salient information and adjustment of care plan pertaining to this individual patient  interaction today are the following:      A. We will continue with restorative and supportive care as the patient tolerates    B. Laboratory examinations will continue to be drawn on an ongoing as-needed basis. The patient's weight needs to be monitored and if needed we may need to institute appetite stimulating medication    C. The patient's long term prognosis is guarded    Charting is done using voice recognition software and may contain errors which may not have been completely corrected                     History of Present Illness       Progress note  DNR CCA        This is a  long-term resident.      chronic abd pain     Patient suffers from schizoaffective disorder, major depression, bipolar disorder, anxiety disorder, paranoia with psychosis. She has had ECT in the past.        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, generalized weakness, impaired mobility, thrombocytopenia, vitamin D deficiency, hypothyroidism     MEDICATION  Facility protocol meds as needed, vitamin B12 1000 mcg daily, nystatin ointment, omeprazole 20 mg daily, vitamin D3 1000 units daily, clonazepam 0.5 mg 3 tablets twice daily, Synthroid 50 mcg daily, Zofran 4 mg every 6 hours as needed, trazodone 100 mg at bedtime, Sinemet  mg 3 times daily, Biotene dry mouth moisturizer, Lipitor 20 mg daily, folic acid 1 mg daily, Effexor  mg daily, valproic acid 250 mg twice daily, ax, artificial tears, Pyridium 200 mg every 12 hours as needed, Estrace 0.1 mg/g insert 0.1 g vaginally at bedtime for 14 days, Abilify 15 mg daily, Celexa 10 mg daily     SOCIAL/FAMILY HISTORY  Reviewed as previously documented      Review of Systems  All system review as allowed by patient condition and nursing input is negative         Active Problems  Problems    · Dementia with parkinsonism (331.82,294.10) (G20,F02.80)   · Depression with anxiety (300.4) (F41.8)   · Family history of breast cancer (V16.3) (Z80.3)   · Fibrocystic breast (610.1) (N60.19)   · Frontal lobe deficit (799.55) (R41.844)   · Memory loss (780.93) (R41.3)   · Neurocognitive deficits (781.99) (R29.818,R41.89)   · Parkinsonism (332.0) (G20)   · Progressive supranuclear palsy (333.0) (G23.1)   · Schizoaffective disorder, mixed type (295.70) (F25.0)     Past Medical History  Problems    · History of anxiety (V11.8) (Z86.59)   · History of hypothyroidism (V12.29) (Z86.39)   · History of schizoaffective disorder (V11.0) (Z86.59)   · History of vitamin D deficiency (V12.1) (Z86.39)   · History of Polyp of vagina (623.7) (N84.2)   · History of Recurrent major depressive disorder, remission status unspecified (296.30)  (F33.9)   · History of Schizoaffective disorder, bipolar type without good prognostic features (295.70)  (F25.0)     Family History  Mother    · Family history of depression (V17.0) (Z81.8)  Father    · Family history of depression (V17.0) (Z81.8)  Sister    · Family history of breast cancer (V16.3) (Z80.3)  Brother    · Family history of mental disorder (V17.0) (Z81.8)  Maternal Aunt    · Family history of breast cancer (V16.3) (Z80.3)     Social History  Problems    · Born in [place]   · Completed college, bachelors degree   · General Sciences.   · Completed elementary school   · Completed graduate school, masters degree   · Criminal Justice Degree   · Completed high school   · Former smoker (V15.82) (Z87.891)   · Has no children   · Retired from employment   · Was a [facility] [employer]  from 1992 to 2009. Before that was [employer]      .   ·  (V61.07) (Z63.4)   ·  from 1989 to 2017 when her  passed away.     Allergies  Medication    · Penicillins   Recorded By: Jessica Felix; 1/16/2014 11:48:33 AM   ·  Tetracyclines   Recorded By: Jessica Felix; 1/16/2014 11:48:33 AM           Physical Exam     Vital signs as per nursing/MA documentation  General appearance: Alert and in no acute distress  HEENT: Normal Inspection  Neck - Normal Inspectiopn  Respiratory : No respiratory distress. Lungs are clear   Cardiovascular: heart rate normal. No gallop  Back - normal inspection  Skin inspection:Warm  Musculoskeletal : No deformities  Neuro : Limited exam. Baseline  Psychiatric : Cooperative      Electronically Signed By: Tereso Acharya MD   3/13/25  2:47 PM

## 2025-03-13 NOTE — PROGRESS NOTES
Provider Impression           Jonah     1. Abdominal pain -chronic  2. Chronic constipation. Continue with a bowel regimen. The patient does have bowel movements every day  3.Severe anxiety and depression managed by psych consultants. Patient is on number of psychotropic agents. Please see HPI for medication list  4. 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 Contributing to her constipation issues  5. Nutritional status is adequate with no concerns. Patient remains obese.  6. Progressive supranuclear ophthalmoplegia. Patient on valproic acid.  7. Skin remains intact without breakdown or decubiti  8. Hypothyroidism currently supplemented     This is a woman who has multiple medical problems including rather severe mental illness. This has resulted in significant self-care deficits. She requires assistance in all activities of daily living. We will work-up this abdominal pain and her complaints. Blood work and ultrasound imaging has been ordered. We will make further recommendations following review of these results.       1. medications are reviewed      2. Continue with rehabilitative, supportive, and or restorative care as ordered and as the patient tolerates     3. Laboratory evaluations will be monitored on an ongoing as needed basis     4. Medications have been cross-referenced with the patient's diagnoses list, and medications reductions have been considered and/or implemented.     5. Pharmacy recommendations are addressed on an ongoing as needed basis.     6. Controlled substances have been electronically scripted every 60 days for opiates and others of similar schedule, and every 6 months for sedative/hypnotics and others of similar schedule.     7. Nursing has been queried about any potential adverse events that need to be reported to me.    Salient information and adjustment of care plan pertaining to this individual patient interaction today are the following:      A. We will continue with  restorative and supportive care as the patient tolerates    B. Laboratory examinations will continue to be drawn on an ongoing as-needed basis. The patient's weight needs to be monitored and if needed we may need to institute appetite stimulating medication    C. The patient's long term prognosis is guarded    Charting is done using voice recognition software and may contain errors which may not have been completely corrected                     History of Present Illness       Progress note  DNR [facility]        This is a  long-term resident.      chronic abd pain     Patient suffers from schizoaffective disorder, major depression, bipolar disorder, anxiety disorder, paranoia with psychosis. She has had ECT in the past.        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, generalized weakness, impaired mobility, thrombocytopenia, vitamin D deficiency, hypothyroidism     MEDICATION  Facility protocol meds as needed, vitamin B12 1000 mcg daily, nystatin ointment, omeprazole 20 mg daily, vitamin D3 1000 units daily, clonazepam 0.5 mg 3 tablets twice daily, Synthroid 50 mcg daily, Zofran 4 mg every 6 hours as needed, trazodone 100 mg at bedtime, Sinemet  mg 3 times daily, Biotene dry mouth moisturizer, Lipitor 20 mg daily, folic acid 1 mg daily, Effexor  mg daily, valproic acid 250 mg twice daily, ax, artificial tears, Pyridium 200 mg every 12 hours as needed, Estrace 0.1 mg/g insert 0.1 g vaginally at bedtime for 14 days, Abilify 15 mg daily, Celexa 10 mg daily     SOCIAL/FAMILY HISTORY  Reviewed as previously documented      Review of Systems  All system review as allowed by patient condition and nursing input is negative        Active Problems  Problems    · Dementia with parkinsonism  (331.82,294.10) (G20,F02.80)   · Depression with anxiety (300.4) (F41.8)   · Family history of breast cancer (V16.3) (Z80.3)   · Fibrocystic breast (610.1) (N60.19)   · Frontal lobe deficit (799.55) (R41.844)   · Memory loss (780.93) (R41.3)   · Neurocognitive deficits (781.99) (R29.818,R41.89)   · Parkinsonism (332.0) (G20)   · Progressive supranuclear palsy (333.0) (G23.1)   · Schizoaffective disorder, mixed type (295.70) (F25.0)     Past Medical History  Problems    · History of anxiety (V11.8) (Z86.59)   · History of hypothyroidism (V12.29) (Z86.39)   · History of schizoaffective disorder (V11.0) (Z86.59)   · History of vitamin D deficiency (V12.1) (Z86.39)   · History of Polyp of vagina (623.7) (N84.2)   · History of Recurrent major depressive disorder, remission status unspecified (296.30)  (F33.9)   · History of Schizoaffective disorder, bipolar type without good prognostic features (295.70)  (F25.0)     Family History  Mother    · Family history of depression (V17.0) (Z81.8)  Father    · Family history of depression (V17.0) (Z81.8)  Sister    · Family history of breast cancer (V16.3) (Z80.3)  Brother    · Family history of mental disorder (V17.0) (Z81.8)  Maternal Aunt    · Family history of breast cancer (V16.3) (Z80.3)     Social History  Problems    · Born in [place]   · Completed college, bachelors degree   · General Sciences.   · Completed elementary school   · Completed graduate school, masters degree   · Criminal Justice Degree   · Completed high school   · Former smoker (V15.82) (Z87.891)   · Has no children   · Retired from employment   · Was a [employer]  from 1992 to 2009. Before that was [employer]      .   ·  (V61.07) (Z63.4)   ·  from 1989 to 2017 when her  passed away.     Allergies  Medication    · Penicillins   Recorded By: Jessica Felix; 1/16/2014 11:48:33 AM   · Tetracyclines   Recorded By: Jessica Felix; 1/16/2014 11:48:33 AM            Physical Exam     Vital signs as per nursing/MA documentation  General appearance: Alert and in no acute distress  HEENT: Normal Inspection  Neck - Normal Inspectiopn  Respiratory : No respiratory distress. Lungs are clear   Cardiovascular: heart rate normal. No gallop  Back - normal inspection  Skin inspection:Warm  Musculoskeletal : No deformities  Neuro : Limited exam. Baseline  Psychiatric : Cooperative

## 2025-04-03 ENCOUNTER — NURSING HOME VISIT (OUTPATIENT)
Dept: POST ACUTE CARE | Facility: EXTERNAL LOCATION | Age: 70
End: 2025-04-03
Payer: MEDICARE

## 2025-04-03 DIAGNOSIS — E03.9 HYPOTHYROIDISM, UNSPECIFIED TYPE: ICD-10-CM

## 2025-04-03 DIAGNOSIS — E46 PROTEIN-CALORIE MALNUTRITION, UNSPECIFIED SEVERITY (MULTI): ICD-10-CM

## 2025-04-03 DIAGNOSIS — F25.9 SCHIZOAFFECTIVE DISORDER, UNSPECIFIED TYPE: ICD-10-CM

## 2025-04-03 DIAGNOSIS — F31.9 BIPOLAR AFFECTIVE DISORDER, REMISSION STATUS UNSPECIFIED (MULTI): Primary | ICD-10-CM

## 2025-04-03 PROCEDURE — 99308 SBSQ NF CARE LOW MDM 20: CPT | Performed by: EMERGENCY MEDICINE

## 2025-04-03 NOTE — LETTER
Patient: Daysi John  : 1955    Encounter Date: 2025    Provider Impression           Jonah     1. Abdominal pain -chronic  2. Chronic constipation. Continue with a bowel regimen. The patient does have bowel movements every day  3.Severe anxiety and depression managed by psych consultants. Patient is on number of psychotropic agents. Please see HPI for medication list  4. 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 Contributing to her constipation issues  5. Nutritional status is adequate with no concerns. Patient remains obese.  6. Progressive supranuclear ophthalmoplegia. Patient on valproic acid.  7. Skin remains intact without breakdown or decubiti  8. Hypothyroidism currently supplemented     This is a woman who has multiple medical problems including rather severe mental illness. This has resulted in significant self-care deficits. She requires assistance in all activities of daily living. We will work-up this abdominal pain and her complaints. Blood work and ultrasound imaging has been ordered. We will make further recommendations following review of these results.       This patient was seen for my regular monthly visit, nursing evaluations and nursing notes were reviewed, interim events are reviewed, interim concerns and messages were reviewed as we have communicated with nursing staff.  Any issues with the falls, skin care impairment, declining physical condition are reviewed and noted, diagnosis list were reviewed, list of medications were reviewed, living will related issues were reviewed, overall patient has been doing well, any declining in patient's condition or any change in patient's condition needs to be notified to physician promptly, discussed with nursing staff, if needed would communicate with family.  Patient stays confined here at the facility for long-term care, there are always concerns about long-term care related issues and concerns.  Nursing staff is trying their best  to keep patient safe, all sort of measures has been taken to keep patient safe and comfortable.                History of Present Illness       Progress note  DNR [facility]        This is a  long-term resident.      chronic abd pain     Patient suffers from schizoaffective disorder, major depression, bipolar disorder, anxiety disorder, paranoia with psychosis. She has had ECT in the past.        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, generalized weakness, impaired mobility, thrombocytopenia, vitamin D deficiency, hypothyroidism     MEDICATION  Facility protocol meds as needed, vitamin B12 1000 mcg daily, nystatin ointment, omeprazole 20 mg daily, vitamin D3 1000 units daily, clonazepam 0.5 mg 3 tablets twice daily, Synthroid 50 mcg daily, Zofran 4 mg every 6 hours as needed, trazodone 100 mg at bedtime, Sinemet  mg 3 times daily, Biotene dry mouth moisturizer, Lipitor 20 mg daily, folic acid 1 mg daily, Effexor  mg daily, valproic acid 250 mg twice daily, ax, artificial tears, Pyridium 200 mg every 12 hours as needed, Estrace 0.1 mg/g insert 0.1 g vaginally at bedtime for 14 days, Abilify 15 mg daily, Celexa 10 mg daily     SOCIAL/FAMILY HISTORY  Reviewed as previously documented      Review of Systems  All system review as allowed by patient condition and nursing input is negative        Active Problems  Problems    · Dementia with parkinsonism (331.82,294.10) (G20,F02.80)   · Depression with anxiety (300.4) (F41.8)   · Family history of breast cancer (V16.3) (Z80.3)   · Fibrocystic breast (610.1) (N60.19)   · Frontal lobe deficit (799.55) (R41.844)   · Memory loss (780.93) (R41.3)   · Neurocognitive deficits (781.99) (R29.818,R41.89)   · Parkinsonism (332.0) (G20)   · Progressive  supranuclear palsy (333.0) (G23.1)   · Schizoaffective disorder, mixed type (295.70) (F25.0)     Past Medical History  Problems    · History of anxiety (V11.8) (Z86.59)   · History of hypothyroidism (V12.29) (Z86.39)   · History of schizoaffective disorder (V11.0) (Z86.59)   · History of vitamin D deficiency (V12.1) (Z86.39)   · History of Polyp of vagina (623.7) (N84.2)   · History of Recurrent major depressive disorder, remission status unspecified (296.30)  (F33.9)   · History of Schizoaffective disorder, bipolar type without good prognostic features (295.70)  (F25.0)     Family History  Mother    · Family history of depression (V17.0) (Z81.8)  Father    · Family history of depression (V17.0) (Z81.8)  Sister    · Family history of breast cancer (V16.3) (Z80.3)  Brother    · Family history of mental disorder (V17.0) (Z81.8)  Maternal Aunt    · Family history of breast cancer (V16.3) (Z80.3)     Social History  Problems    · Born in [place]   · Completed college, bachelors degree   · General Sciences.   · Completed elementary school   · Completed graduate school, masters degree   · Criminal Justice Degree   · Completed high school   · Former smoker (V15.82) (Z87.891)   · Has no children   · Retired from employment   · Was a [facility] [employer]  from 1992 to 2009. Before that was [employer]      .   ·  (V61.07) (Z63.4)   ·  from 1989 to 2017 when her  passed away.     Allergies  Medication    · Penicillins   Recorded By: Jessica Felix; 1/16/2014 11:48:33 AM   · Tetracyclines   Recorded By: Jessica Felix; 1/16/2014 11:48:33 AM           Physical Exam     Vital signs as per nursing/MA documentation  General appearance: Alert and in no acute distress  HEENT: Normal Inspection  Neck - Normal Inspectiopn  Respiratory : No respiratory distress. Lungs are clear   Cardiovascular: heart rate normal. No gallop  Back - normal inspection  Skin inspection:Warm  Musculoskeletal : No  deformities  Neuro : Limited exam. Baseline  Psychiatric : Cooperative      Electronically Signed By: Tereso Acharya MD   4/10/25  5:21 PM

## 2025-04-10 NOTE — PROGRESS NOTES
Provider Impression           Jonah     1. Abdominal pain -chronic  2. Chronic constipation. Continue with a bowel regimen. The patient does have bowel movements every day  3.Severe anxiety and depression managed by psych consultants. Patient is on number of psychotropic agents. Please see HPI for medication list  4. 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 Contributing to her constipation issues  5. Nutritional status is adequate with no concerns. Patient remains obese.  6. Progressive supranuclear ophthalmoplegia. Patient on valproic acid.  7. Skin remains intact without breakdown or decubiti  8. Hypothyroidism currently supplemented     This is a woman who has multiple medical problems including rather severe mental illness. This has resulted in significant self-care deficits. She requires assistance in all activities of daily living. We will work-up this abdominal pain and her complaints. Blood work and ultrasound imaging has been ordered. We will make further recommendations following review of these results.       This patient was seen for my regular monthly visit, nursing evaluations and nursing notes were reviewed, interim events are reviewed, interim concerns and messages were reviewed as we have communicated with nursing staff.  Any issues with the falls, skin care impairment, declining physical condition are reviewed and noted, diagnosis list were reviewed, list of medications were reviewed, living will related issues were reviewed, overall patient has been doing well, any declining in patient's condition or any change in patient's condition needs to be notified to physician promptly, discussed with nursing staff, if needed would communicate with family.  Patient stays confined here at the facility for long-term care, there are always concerns about long-term care related issues and concerns.  Nursing staff is trying their best to keep patient safe, all sort of measures has been taken to keep  patient safe and comfortable.                History of Present Illness       Progress note  DNR [facility]        This is a  long-term resident.      chronic abd pain     Patient suffers from schizoaffective disorder, major depression, bipolar disorder, anxiety disorder, paranoia with psychosis. She has had ECT in the past.        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, generalized weakness, impaired mobility, thrombocytopenia, vitamin D deficiency, hypothyroidism     MEDICATION  Facility protocol meds as needed, vitamin B12 1000 mcg daily, nystatin ointment, omeprazole 20 mg daily, vitamin D3 1000 units daily, clonazepam 0.5 mg 3 tablets twice daily, Synthroid 50 mcg daily, Zofran 4 mg every 6 hours as needed, trazodone 100 mg at bedtime, Sinemet  mg 3 times daily, Biotene dry mouth moisturizer, Lipitor 20 mg daily, folic acid 1 mg daily, Effexor  mg daily, valproic acid 250 mg twice daily, ax, artificial tears, Pyridium 200 mg every 12 hours as needed, Estrace 0.1 mg/g insert 0.1 g vaginally at bedtime for 14 days, Abilify 15 mg daily, Celexa 10 mg daily     SOCIAL/FAMILY HISTORY  Reviewed as previously documented      Review of Systems  All system review as allowed by patient condition and nursing input is negative        Active Problems  Problems    · Dementia with parkinsonism (331.82,294.10) (G20,F02.80)   · Depression with anxiety (300.4) (F41.8)   · Family history of breast cancer (V16.3) (Z80.3)   · Fibrocystic breast (610.1) (N60.19)   · Frontal lobe deficit (799.55) (R41.844)   · Memory loss (780.93) (R41.3)   · Neurocognitive deficits (781.99) (R29.818,R41.89)   · Parkinsonism (332.0) (G20)   · Progressive supranuclear palsy (333.0) (G23.1)   · Schizoaffective disorder, mixed  type (295.70) (F25.0)     Past Medical History  Problems    · History of anxiety (V11.8) (Z86.59)   · History of hypothyroidism (V12.29) (Z86.39)   · History of schizoaffective disorder (V11.0) (Z86.59)   · History of vitamin D deficiency (V12.1) (Z86.39)   · History of Polyp of vagina (623.7) (N84.2)   · History of Recurrent major depressive disorder, remission status unspecified (296.30)  (F33.9)   · History of Schizoaffective disorder, bipolar type without good prognostic features (295.70)  (F25.0)     Family History  Mother    · Family history of depression (V17.0) (Z81.8)  Father    · Family history of depression (V17.0) (Z81.8)  Sister    · Family history of breast cancer (V16.3) (Z80.3)  Brother    · Family history of mental disorder (V17.0) (Z81.8)  Maternal Aunt    · Family history of breast cancer (V16.3) (Z80.3)     Social History  Problems    · Born in [place]   · Completed college, bachelors degree   · General Sciences.   · Completed elementary school   · Completed graduate school, masters degree   · Criminal Justice Degree   · Completed high school   · Former smoker (V15.82) (Z87.891)   · Has no children   · Retired from employment   · Was a [facility]  from 1992 to 2009. Before that was [employer]      .   ·  (V61.07) (Z63.4)   ·  from 1989 to 2017 when her  passed away.     Allergies  Medication    · Penicillins   Recorded By: Jessica Felix; 1/16/2014 11:48:33 AM   · Tetracyclines   Recorded By: Jessica Felix; 1/16/2014 11:48:33 AM           Physical Exam     Vital signs as per nursing/MA documentation  General appearance: Alert and in no acute distress  HEENT: Normal Inspection  Neck - Normal Inspectiopn  Respiratory : No respiratory distress. Lungs are clear   Cardiovascular: heart rate normal. No gallop  Back - normal inspection  Skin inspection:Warm  Musculoskeletal : No deformities  Neuro : Limited exam. Baseline  Psychiatric :  Cooperative

## 2025-05-13 ENCOUNTER — NURSING HOME VISIT (OUTPATIENT)
Dept: POST ACUTE CARE | Facility: EXTERNAL LOCATION | Age: 70
End: 2025-05-13
Payer: MEDICARE

## 2025-05-13 DIAGNOSIS — F25.9 SCHIZOAFFECTIVE DISORDER, UNSPECIFIED TYPE: ICD-10-CM

## 2025-05-13 DIAGNOSIS — F31.9 BIPOLAR AFFECTIVE DISORDER, REMISSION STATUS UNSPECIFIED (MULTI): ICD-10-CM

## 2025-05-13 DIAGNOSIS — E46 PROTEIN-CALORIE MALNUTRITION, UNSPECIFIED SEVERITY (MULTI): ICD-10-CM

## 2025-05-13 DIAGNOSIS — E03.9 HYPOTHYROIDISM, UNSPECIFIED TYPE: Primary | ICD-10-CM

## 2025-05-13 PROCEDURE — 99309 SBSQ NF CARE MODERATE MDM 30: CPT | Performed by: EMERGENCY MEDICINE

## 2025-05-13 NOTE — PROGRESS NOTES
Provider Impression           Jonah     1. Abdominal pain -chronic  2. Chronic constipation. Continue with a bowel regimen. The patient does have bowel movements every day  3.Severe anxiety and depression managed by psych consultants. Patient is on number of psychotropic agents. Please see HPI for medication list  4. 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 Contributing to her constipation issues  5. Nutritional status is adequate with no concerns. Patient remains obese.  6. Progressive supranuclear ophthalmoplegia. Patient on valproic acid.  7. Skin remains intact without breakdown or decubiti  8. Hypothyroidism currently supplemented     This is a woman who has multiple medical problems including rather severe mental illness. This has resulted in significant self-care deficits. She requires assistance in all activities of daily living. We will work-up this abdominal pain and her complaints. Blood work and ultrasound imaging has been ordered. We will make further recommendations following review of these results.       All available hospital and outpatient records have been reviewed. Will continue other current drug therapies as ordered on the continuation of care documents. Physical and Occupational Therapy will assess and treat per POC. Analgesia for identified pain symptoms. Continue the various medicines for bowel and bladder symptoms as well as the vitamins and supplements per hospital instructions. Will assess and provide local care to abnormalities of skin integrity. Drug to drug interaction data as noted by the pharmacy has been reviewed. The patient's condition warrants the continuation of these drugs as prescribed by the medical specialists. Discharge planning will be coordinated through the  department. I have reviewed any advanced directive documentation that is contained in the admission packet as directives indicating whether a surrogate decision maker has been identified.               History of Present Illness       Progress note  DNR CCA        This is a  long-term resident.      chronic abd pain     Patient suffers from schizoaffective disorder, major depression, bipolar disorder, anxiety disorder, paranoia with psychosis. She has had ECT in the past.        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, generalized weakness, impaired mobility, thrombocytopenia, vitamin D deficiency, hypothyroidism     MEDICATION  Facility protocol meds as needed, vitamin B12 1000 mcg daily, nystatin ointment, omeprazole 20 mg daily, vitamin D3 1000 units daily, clonazepam 0.5 mg 3 tablets twice daily, Synthroid 50 mcg daily, Zofran 4 mg every 6 hours as needed, trazodone 100 mg at bedtime, Sinemet  mg 3 times daily, Biotene dry mouth moisturizer, Lipitor 20 mg daily, folic acid 1 mg daily, Effexor  mg daily, valproic acid 250 mg twice daily, ax, artificial tears, Pyridium 200 mg every 12 hours as needed, Estrace 0.1 mg/g insert 0.1 g vaginally at bedtime for 14 days, Abilify 15 mg daily, Celexa 10 mg daily     SOCIAL/FAMILY HISTORY  Reviewed as previously documented      Review of Systems  All system review as allowed by patient condition and nursing input is negative        Active Problems  Problems    · Dementia with parkinsonism (331.82,294.10) (G20,F02.80)   · Depression with anxiety (300.4) (F41.8)   · Family history of breast cancer (V16.3) (Z80.3)   · Fibrocystic breast (610.1) (N60.19)   · Frontal lobe deficit (799.55) (R41.844)   · Memory loss (780.93) (R41.3)   · Neurocognitive deficits (781.99) (R29.818,R41.89)   · Parkinsonism (332.0) (G20)   · Progressive supranuclear palsy (333.0) (G23.1)   · Schizoaffective disorder, mixed type (295.70) (F25.0)     Past Medical  History  Problems    · History of anxiety (V11.8) (Z86.59)   · History of hypothyroidism (V12.29) (Z86.39)   · History of schizoaffective disorder (V11.0) (Z86.59)   · History of vitamin D deficiency (V12.1) (Z86.39)   · History of Polyp of vagina (623.7) (N84.2)   · History of Recurrent major depressive disorder, remission status unspecified (296.30)  (F33.9)   · History of Schizoaffective disorder, bipolar type without good prognostic features (295.70)  (F25.0)     Family History  Mother    · Family history of depression (V17.0) (Z81.8)  Father    · Family history of depression (V17.0) (Z81.8)  Sister    · Family history of breast cancer (V16.3) (Z80.3)  Brother    · Family history of mental disorder (V17.0) (Z81.8)  Maternal Aunt    · Family history of breast cancer (V16.3) (Z80.3)     Social History  Problems    · Born in [place]   · Completed college, bachelors degree   · General Sciences.   · Completed elementary school   · Completed graduate school, masters degree   · Criminal Justice Degree   · Completed high school   · Former smoker (V15.82) (Z87.891)   · Has no children   · Retired from employment   · Was a [facility]  from 1992 to 2009. Before that was [employer]      .   ·  (V61.07) (Z63.4)   ·  from 1989 to 2017 when her  passed away.     Allergies  Medication    · Penicillins   Recorded By: Jessica Felix; 1/16/2014 11:48:33 AM   · Tetracyclines   Recorded By: Jessica Felix; 1/16/2014 11:48:33 AM           Physical Exam     Vital signs as per nursing/MA documentation  General appearance: Alert and in no acute distress  HEENT: Normal Inspection  Neck - Normal Inspectiopn  Respiratory : No respiratory distress. Lungs are clear   Cardiovascular: heart rate normal. No gallop  Back - normal inspection  Skin inspection:Warm  Musculoskeletal : No deformities  Neuro : Limited exam. Baseline  Psychiatric : Cooperative

## 2025-05-13 NOTE — LETTER
Patient: Daysi John  : 1955    Encounter Date: 2025    Provider Impression           Jonah     1. Abdominal pain -chronic  2. Chronic constipation. Continue with a bowel regimen. The patient does have bowel movements every day  3.Severe anxiety and depression managed by psych consultants. Patient is on number of psychotropic agents. Please see HPI for medication list  4. 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 Contributing to her constipation issues  5. Nutritional status is adequate with no concerns. Patient remains obese.  6. Progressive supranuclear ophthalmoplegia. Patient on valproic acid.  7. Skin remains intact without breakdown or decubiti  8. Hypothyroidism currently supplemented     This is a woman who has multiple medical problems including rather severe mental illness. This has resulted in significant self-care deficits. She requires assistance in all activities of daily living. We will work-up this abdominal pain and her complaints. Blood work and ultrasound imaging has been ordered. We will make further recommendations following review of these results.       All available hospital and outpatient records have been reviewed. Will continue other current drug therapies as ordered on the continuation of care documents. Physical and Occupational Therapy will assess and treat per POC. Analgesia for identified pain symptoms. Continue the various medicines for bowel and bladder symptoms as well as the vitamins and supplements per hospital instructions. Will assess and provide local care to abnormalities of skin integrity. Drug to drug interaction data as noted by the pharmacy has been reviewed. The patient's condition warrants the continuation of these drugs as prescribed by the medical specialists. Discharge planning will be coordinated through the  department. I have reviewed any advanced directive documentation that is contained in the admission packet as directives  indicating whether a surrogate decision maker has been identified.              History of Present Illness       Progress note  DNR [facility]        This is a  long-term resident.      chronic abd pain     Patient suffers from schizoaffective disorder, major depression, bipolar disorder, anxiety disorder, paranoia with psychosis. She has had ECT in the past.        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, generalized weakness, impaired mobility, thrombocytopenia, vitamin D deficiency, hypothyroidism     MEDICATION  Facility protocol meds as needed, vitamin B12 1000 mcg daily, nystatin ointment, omeprazole 20 mg daily, vitamin D3 1000 units daily, clonazepam 0.5 mg 3 tablets twice daily, Synthroid 50 mcg daily, Zofran 4 mg every 6 hours as needed, trazodone 100 mg at bedtime, Sinemet  mg 3 times daily, Biotene dry mouth moisturizer, Lipitor 20 mg daily, folic acid 1 mg daily, Effexor  mg daily, valproic acid 250 mg twice daily, ax, artificial tears, Pyridium 200 mg every 12 hours as needed, Estrace 0.1 mg/g insert 0.1 g vaginally at bedtime for 14 days, Abilify 15 mg daily, Celexa 10 mg daily     SOCIAL/FAMILY HISTORY  Reviewed as previously documented      Review of Systems  All system review as allowed by patient condition and nursing input is negative        Active Problems  Problems    · Dementia with parkinsonism (331.82,294.10) (G20,F02.80)   · Depression with anxiety (300.4) (F41.8)   · Family history of breast cancer (V16.3) (Z80.3)   · Fibrocystic breast (610.1) (N60.19)   · Frontal lobe deficit (799.55) (R41.844)   · Memory loss (780.93) (R41.3)   · Neurocognitive deficits (781.99) (R29.818,R41.89)   · Parkinsonism (332.0) (G20)   · Progressive supranuclear palsy (333.0) (G23.1)   ·  Schizoaffective disorder, mixed type (295.70) (F25.0)     Past Medical History  Problems    · History of anxiety (V11.8) (Z86.59)   · History of hypothyroidism (V12.29) (Z86.39)   · History of schizoaffective disorder (V11.0) (Z86.59)   · History of vitamin D deficiency (V12.1) (Z86.39)   · History of Polyp of vagina (623.7) (N84.2)   · History of Recurrent major depressive disorder, remission status unspecified (296.30)  (F33.9)   · History of Schizoaffective disorder, bipolar type without good prognostic features (295.70)  (F25.0)     Family History  Mother    · Family history of depression (V17.0) (Z81.8)  Father    · Family history of depression (V17.0) (Z81.8)  Sister    · Family history of breast cancer (V16.3) (Z80.3)  Brother    · Family history of mental disorder (V17.0) (Z81.8)  Maternal Aunt    · Family history of breast cancer (V16.3) (Z80.3)     Social History  Problems    · Born in [place]   · Completed college, bachelors degree   · General Sciences.   · Completed elementary school   · Completed graduate school, masters degree   · Criminal Justice Degree   · Completed high school   · Former smoker (V15.82) (Z87.891)   · Has no children   · Retired from employment   · Was a [facility]  from 1992 to 2009. Before that was [employer]      .   ·  (V61.07) (Z63.4)   ·  from 1989 to 2017 when her  passed away.     Allergies  Medication    · Penicillins   Recorded By: Jessica Felix; 1/16/2014 11:48:33 AM   · Tetracyclines   Recorded By: Jessica Felix; 1/16/2014 11:48:33 AM           Physical Exam     Vital signs as per nursing/MA documentation  General appearance: Alert and in no acute distress  HEENT: Normal Inspection  Neck - Normal Inspectiopn  Respiratory : No respiratory distress. Lungs are clear   Cardiovascular: heart rate normal. No gallop  Back - normal inspection  Skin inspection:Warm  Musculoskeletal : No deformities  Neuro : Limited exam.  Baseline  Psychiatric : Cooperative    Electronically Signed By: Tereso Acharya MD   5/13/25  5:08 PM

## 2025-06-12 ENCOUNTER — NURSING HOME VISIT (OUTPATIENT)
Dept: POST ACUTE CARE | Facility: EXTERNAL LOCATION | Age: 70
End: 2025-06-12
Payer: MEDICARE

## 2025-06-12 DIAGNOSIS — E03.9 HYPOTHYROIDISM, UNSPECIFIED TYPE: Primary | ICD-10-CM

## 2025-06-12 DIAGNOSIS — F25.9 SCHIZOAFFECTIVE DISORDER, UNSPECIFIED TYPE: ICD-10-CM

## 2025-06-12 DIAGNOSIS — F31.9 BIPOLAR AFFECTIVE DISORDER, REMISSION STATUS UNSPECIFIED (MULTI): ICD-10-CM

## 2025-06-12 DIAGNOSIS — F29 ATYPICAL PSYCHOSIS: ICD-10-CM

## 2025-06-12 PROCEDURE — 99308 SBSQ NF CARE LOW MDM 20: CPT | Performed by: EMERGENCY MEDICINE

## 2025-06-12 NOTE — LETTER
Patient: Daysi John  : 1955    Encounter Date: 2025    Provider Impression           Jonah     1. Abdominal pain -chronic  2. Chronic constipation. Continue with a bowel regimen. The patient does have bowel movements every day  3.Severe anxiety and depression managed by psych consultants. Patient is on number of psychotropic agents. Please see HPI for medication list  4. 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 Contributing to her constipation issues  5. Nutritional status is adequate with no concerns. Patient remains obese.  6. Progressive supranuclear ophthalmoplegia. Patient on valproic acid.  7. Skin remains intact without breakdown or decubiti  8. Hypothyroidism currently supplemented     This is a woman who has multiple medical problems including rather severe mental illness. This has resulted in significant self-care deficits. She requires assistance in all activities of daily living. We will work-up this abdominal pain and her complaints. Blood work and ultrasound imaging has been ordered. We will make further recommendations following review of these results.         Skin integrity:  Nursing to monitor skin integrity as patient is at risk for pressure injuries.  Wound care per nursing  See Facility notes for measurements/assessments  Turn and reposition Q 2 hours or more  Air mattress and when up in chair cushion reducing device  Dietician to evaluate and recommend:  Nutritional supplement:  Please monitor skin integrity and other pressure areas  Referral to wound clinic if appropriate:     Pt has been seen for follow up visit.  Recent nursing evaluation and notes were reviewed.   Overall, patient is stable despite his/her chronic conditions.      Any decline or change in condition needs to be communicated with the physician or myself.    Discussion with nursing staff regarding ongoing care and management.  If needed, would communicate with family who are not present at this time.    There are no concerns at this time.  We will continue with the medications noted above.    We will continue to follow the patient here at the facility.      *Please note that nursing facility, outside laboratory agency, and  [facility] do not interface.              History of Present Illness       Progress note  DNR [facility]        This is a  long-term resident.      chronic abd pain     Patient suffers from schizoaffective disorder, major depression, bipolar disorder, anxiety disorder, paranoia with psychosis. She has had ECT in the past.        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, generalized weakness, impaired mobility, thrombocytopenia, vitamin D deficiency, hypothyroidism     MEDICATION  Facility protocol meds as needed, vitamin B12 1000 mcg daily, nystatin ointment, omeprazole 20 mg daily, vitamin D3 1000 units daily, clonazepam 0.5 mg 3 tablets twice daily, Synthroid 50 mcg daily, Zofran 4 mg every 6 hours as needed, trazodone 100 mg at bedtime, Sinemet  mg 3 times daily, Biotene dry mouth moisturizer, Lipitor 20 mg daily, folic acid 1 mg daily, Effexor  mg daily, valproic acid 250 mg twice daily, ax, artificial tears, Pyridium 200 mg every 12 hours as needed, Estrace 0.1 mg/g insert 0.1 g vaginally at bedtime for 14 days, Abilify 15 mg daily, Celexa 10 mg daily     SOCIAL/FAMILY HISTORY  Reviewed as previously documented      Review of Systems  All system review as allowed by patient condition and nursing input is negative        Active Problems  Problems    · Dementia with parkinsonism (331.82,294.10) (G20,F02.80)   · Depression with anxiety (300.4) (F41.8)   · Family history of breast cancer (V16.3) (Z80.3)   · Fibrocystic breast (610.1) (N60.19)   · Frontal lobe deficit  (799.55) (R41.844)   · Memory loss (780.93) (R41.3)   · Neurocognitive deficits (781.99) (R29.818,R41.89)   · Parkinsonism (332.0) (G20)   · Progressive supranuclear palsy (333.0) (G23.1)   · Schizoaffective disorder, mixed type (295.70) (F25.0)     Past Medical History  Problems    · History of anxiety (V11.8) (Z86.59)   · History of hypothyroidism (V12.29) (Z86.39)   · History of schizoaffective disorder (V11.0) (Z86.59)   · History of vitamin D deficiency (V12.1) (Z86.39)   · History of Polyp of vagina (623.7) (N84.2)   · History of Recurrent major depressive disorder, remission status unspecified (296.30)  (F33.9)   · History of Schizoaffective disorder, bipolar type without good prognostic features (295.70)  (F25.0)     Family History  Mother    · Family history of depression (V17.0) (Z81.8)  Father    · Family history of depression (V17.0) (Z81.8)  Sister    · Family history of breast cancer (V16.3) (Z80.3)  Brother    · Family history of mental disorder (V17.0) (Z81.8)  Maternal Aunt    · Family history of breast cancer (V16.3) (Z80.3)     Social History  Problems    · Born in [place]   · Completed college, bachelors degree   · General Sciences.   · Completed elementary school   · Completed graduate school, masters degree   · Criminal Justice Degree   · Completed high school   · Former smoker (V15.82) (Z87.891)   · Has no children   · Retired from employment   · Was a [employer]  from 1992 to 2009. Before that was [employer]      .   ·  (V61.07) (Z63.4)   ·  from 1989 to 2017 when her  passed away.     Allergies  Medication    · Penicillins   Recorded By: Jessica Felix; 1/16/2014 11:48:33 AM   · Tetracyclines   Recorded By: Jessica Felix; 1/16/2014 11:48:33 AM           Physical Exam     Vital signs as per nursing/MA documentation  General appearance: Alert and in no acute distress  HEENT: Normal Inspection  Neck - Normal Inspectiopn  Respiratory : No  respiratory distress. Lungs are clear   Cardiovascular: heart rate normal. No gallop  Back - normal inspection  Skin inspection:Warm  Musculoskeletal : No deformities  Neuro : Limited exam. Baseline  Psychiatric : Cooperative    Electronically Signed By: Tereso Acharya MD   6/15/25  8:24 AM

## 2025-06-15 NOTE — PROGRESS NOTES
Provider Impression           Jonah     1. Abdominal pain -chronic  2. Chronic constipation. Continue with a bowel regimen. The patient does have bowel movements every day  3.Severe anxiety and depression managed by psych consultants. Patient is on number of psychotropic agents. Please see HPI for medication list  4. 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 Contributing to her constipation issues  5. Nutritional status is adequate with no concerns. Patient remains obese.  6. Progressive supranuclear ophthalmoplegia. Patient on valproic acid.  7. Skin remains intact without breakdown or decubiti  8. Hypothyroidism currently supplemented     This is a woman who has multiple medical problems including rather severe mental illness. This has resulted in significant self-care deficits. She requires assistance in all activities of daily living. We will work-up this abdominal pain and her complaints. Blood work and ultrasound imaging has been ordered. We will make further recommendations following review of these results.         Skin integrity:  Nursing to monitor skin integrity as patient is at risk for pressure injuries.  Wound care per nursing  See Facility notes for measurements/assessments  Turn and reposition Q 2 hours or more  Air mattress and when up in chair cushion reducing device  Dietician to evaluate and recommend:  Nutritional supplement:  Please monitor skin integrity and other pressure areas  Referral to wound clinic if appropriate:     Pt has been seen for follow up visit.  Recent nursing evaluation and notes were reviewed.   Overall, patient is stable despite his/her chronic conditions.      Any decline or change in condition needs to be communicated with the physician or myself.    Discussion with nursing staff regarding ongoing care and management.  If needed, would communicate with family who are not present at this time.   There are no concerns at this time.  We will continue with the  medications noted above.    We will continue to follow the patient here at the facility.      *Please note that nursing facility, outside laboratory agency, and  [facility] do not interface.              History of Present Illness       Progress note  DNR [facility]        This is a  long-term resident.      chronic abd pain     Patient suffers from schizoaffective disorder, major depression, bipolar disorder, anxiety disorder, paranoia with psychosis. She has had ECT in the past.        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, generalized weakness, impaired mobility, thrombocytopenia, vitamin D deficiency, hypothyroidism     MEDICATION  Facility protocol meds as needed, vitamin B12 1000 mcg daily, nystatin ointment, omeprazole 20 mg daily, vitamin D3 1000 units daily, clonazepam 0.5 mg 3 tablets twice daily, Synthroid 50 mcg daily, Zofran 4 mg every 6 hours as needed, trazodone 100 mg at bedtime, Sinemet  mg 3 times daily, Biotene dry mouth moisturizer, Lipitor 20 mg daily, folic acid 1 mg daily, Effexor  mg daily, valproic acid 250 mg twice daily, ax, artificial tears, Pyridium 200 mg every 12 hours as needed, Estrace 0.1 mg/g insert 0.1 g vaginally at bedtime for 14 days, Abilify 15 mg daily, Celexa 10 mg daily     SOCIAL/FAMILY HISTORY  Reviewed as previously documented      Review of Systems  All system review as allowed by patient condition and nursing input is negative        Active Problems  Problems    · Dementia with parkinsonism (331.82,294.10) (G20,F02.80)   · Depression with anxiety (300.4) (F41.8)   · Family history of breast cancer (V16.3) (Z80.3)   · Fibrocystic breast (610.1) (N60.19)   · Frontal lobe deficit (799.55) (R41.844)   · Memory loss (780.93) (R41.3)   · Neurocognitive  deficits (781.99) (R29.818,R41.89)   · Parkinsonism (332.0) (G20)   · Progressive supranuclear palsy (333.0) (G23.1)   · Schizoaffective disorder, mixed type (295.70) (F25.0)     Past Medical History  Problems    · History of anxiety (V11.8) (Z86.59)   · History of hypothyroidism (V12.29) (Z86.39)   · History of schizoaffective disorder (V11.0) (Z86.59)   · History of vitamin D deficiency (V12.1) (Z86.39)   · History of Polyp of vagina (623.7) (N84.2)   · History of Recurrent major depressive disorder, remission status unspecified (296.30)  (F33.9)   · History of Schizoaffective disorder, bipolar type without good prognostic features (295.70)  (F25.0)     Family History  Mother    · Family history of depression (V17.0) (Z81.8)  Father    · Family history of depression (V17.0) (Z81.8)  Sister    · Family history of breast cancer (V16.3) (Z80.3)  Brother    · Family history of mental disorder (V17.0) (Z81.8)  Maternal Aunt    · Family history of breast cancer (V16.3) (Z80.3)     Social History  Problems    · Born in [place]   · Completed college, bachelors degree   · General Sciences.   · Completed elementary school   · Completed graduate school, masters degree   · Criminal Justice Degree   · Completed high school   · Former smoker (V15.82) (Z87.891)   · Has no children   · Retired from employment   · Was a [employer]  from 1992 to 2009. Before that was [employer]      .   ·  (V61.07) (Z63.4)   ·  from 1989 to 2017 when her  passed away.     Allergies  Medication    · Penicillins   Recorded By: Jessica Felix; 1/16/2014 11:48:33 AM   · Tetracyclines   Recorded By: Jessica Felix; 1/16/2014 11:48:33 AM           Physical Exam     Vital signs as per nursing/MA documentation  General appearance: Alert and in no acute distress  HEENT: Normal Inspection  Neck - Normal Inspectiopn  Respiratory : No respiratory distress. Lungs are clear   Cardiovascular: heart rate  normal. No gallop  Back - normal inspection  Skin inspection:Warm  Musculoskeletal : No deformities  Neuro : Limited exam. Baseline  Psychiatric : Cooperative

## 2025-07-10 ENCOUNTER — NURSING HOME VISIT (OUTPATIENT)
Dept: POST ACUTE CARE | Facility: EXTERNAL LOCATION | Age: 70
End: 2025-07-10

## 2025-07-10 DIAGNOSIS — F31.9 BIPOLAR AFFECTIVE DISORDER, REMISSION STATUS UNSPECIFIED (MULTI): ICD-10-CM

## 2025-07-10 DIAGNOSIS — E03.9 HYPOTHYROIDISM, UNSPECIFIED TYPE: ICD-10-CM

## 2025-07-10 DIAGNOSIS — F25.9 SCHIZOAFFECTIVE DISORDER, UNSPECIFIED TYPE: ICD-10-CM

## 2025-07-10 DIAGNOSIS — E46 PROTEIN-CALORIE MALNUTRITION, UNSPECIFIED SEVERITY (MULTI): Primary | ICD-10-CM

## 2025-07-10 NOTE — LETTER
Patient: Daysi John  : 1955    Encounter Date: 07/10/2025    Provider Impression           Jonah     1. Abdominal pain -chronic  2. Chronic constipation. Continue with a bowel regimen. The patient does have bowel movements every day  3.Severe anxiety and depression managed by psych consultants. Patient is on number of psychotropic agents. Please see HPI for medication list  4. 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 Contributing to her constipation issues  5. Nutritional status is adequate with no concerns. Patient remains obese.  6. Progressive supranuclear ophthalmoplegia. Patient on valproic acid.  7. Skin remains intact without breakdown or decubiti  8. Hypothyroidism currently supplemented     This is a woman who has multiple medical problems including rather severe mental illness. This has resulted in significant self-care deficits. She requires assistance in all activities of daily living. We will work-up this abdominal pain and her complaints. Blood work and ultrasound imaging has been ordered. We will make further recommendations following review of these results.         All available hospital and outpatient records have been reviewed. Will continue other current drug therapies as ordered on the continuation of care documents. Physical and Occupational Therapy will assess and treat per POC. Analgesia for identified pain symptoms. Continue the various medicines for bowel and bladder symptoms as well as the vitamins and supplements per hospital instructions. Will assess and provide local care to abnormalities of skin integrity. Drug to drug interaction data as noted by the pharmacy has been reviewed. The patient's condition warrants the continuation of these drugs as prescribed by the medical specialists. Discharge planning will be coordinated through the  department. I have reviewed any advanced directive documentation that is contained in the admission packet as directives  indicating whether a surrogate decision maker has been identified.              History of Present Illness       Progress note  DNR [facility]        This is a  long-term resident.      chronic abd pain     Patient suffers from schizoaffective disorder, major depression, bipolar disorder, anxiety disorder, paranoia with psychosis. She has had ECT in the past.        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, generalized weakness, impaired mobility, thrombocytopenia, vitamin D deficiency, hypothyroidism     MEDICATION  Facility protocol meds as needed, vitamin B12 1000 mcg daily, nystatin ointment, omeprazole 20 mg daily, vitamin D3 1000 units daily, clonazepam 0.5 mg 3 tablets twice daily, Synthroid 50 mcg daily, Zofran 4 mg every 6 hours as needed, trazodone 100 mg at bedtime, Sinemet  mg 3 times daily, Biotene dry mouth moisturizer, Lipitor 20 mg daily, folic acid 1 mg daily, Effexor  mg daily, valproic acid 250 mg twice daily, ax, artificial tears, Pyridium 200 mg every 12 hours as needed, Estrace 0.1 mg/g insert 0.1 g vaginally at bedtime for 14 days, Abilify 15 mg daily, Celexa 10 mg daily     SOCIAL/FAMILY HISTORY  Reviewed as previously documented      Review of Systems  All system review as allowed by patient condition and nursing input is negative        Active Problems  Problems    · Dementia with parkinsonism (331.82,294.10) (G20,F02.80)   · Depression with anxiety (300.4) (F41.8)   · Family history of breast cancer (V16.3) (Z80.3)   · Fibrocystic breast (610.1) (N60.19)   · Frontal lobe deficit (799.55) (R41.844)   · Memory loss (780.93) (R41.3)   · Neurocognitive deficits (781.99) (R29.818,R41.89)   · Parkinsonism (332.0) (G20)   · Progressive supranuclear palsy (333.0) (G23.1)   ·  Schizoaffective disorder, mixed type (295.70) (F25.0)     Past Medical History  Problems    · History of anxiety (V11.8) (Z86.59)   · History of hypothyroidism (V12.29) (Z86.39)   · History of schizoaffective disorder (V11.0) (Z86.59)   · History of vitamin D deficiency (V12.1) (Z86.39)   · History of Polyp of vagina (623.7) (N84.2)   · History of Recurrent major depressive disorder, remission status unspecified (296.30)  (F33.9)   · History of Schizoaffective disorder, bipolar type without good prognostic features (295.70)  (F25.0)     Family History  Mother    · Family history of depression (V17.0) (Z81.8)  Father    · Family history of depression (V17.0) (Z81.8)  Sister    · Family history of breast cancer (V16.3) (Z80.3)  Brother    · Family history of mental disorder (V17.0) (Z81.8)  Maternal Aunt    · Family history of breast cancer (V16.3) (Z80.3)     Social History  Problems    · Born in [place]   · Completed college, bachelors degree   · General Sciences.   · Completed elementary school   · Completed graduate school, masters degree   · Criminal Justice Degree   · Completed high school   · Former smoker (V15.82) (Z87.891)   · Has no children   · Retired from employment   · Was a [employer]  from 1992 to 2009. Before that was [employer]      .   ·  (V61.07) (Z63.4)   ·  from 1989 to 2017 when her  passed away.     Allergies  Medication    · Penicillins   Recorded By: Jessica Felix; 1/16/2014 11:48:33 AM   · Tetracyclines   Recorded By: Jessica Felix; 1/16/2014 11:48:33 AM           Physical Exam     Vital signs as per nursing/MA documentation  General appearance: Alert and in no acute distress  HEENT: Normal Inspection  Neck - Normal Inspectiopn  Respiratory : No respiratory distress. Lungs are clear   Cardiovascular: heart rate normal. No gallop  Back - normal inspection  Skin inspection:Warm  Musculoskeletal : No deformities  Neuro : Limited exam.  Baseline  Psychiatric : Cooperative    Electronically Signed By: Tereso Acharya MD   7/12/25  9:12 AM

## 2025-07-12 NOTE — PROGRESS NOTES
Provider Impression           Jonah     1. Abdominal pain -chronic  2. Chronic constipation. Continue with a bowel regimen. The patient does have bowel movements every day  3.Severe anxiety and depression managed by psych consultants. Patient is on number of psychotropic agents. Please see HPI for medication list  4. 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 Contributing to her constipation issues  5. Nutritional status is adequate with no concerns. Patient remains obese.  6. Progressive supranuclear ophthalmoplegia. Patient on valproic acid.  7. Skin remains intact without breakdown or decubiti  8. Hypothyroidism currently supplemented     This is a woman who has multiple medical problems including rather severe mental illness. This has resulted in significant self-care deficits. She requires assistance in all activities of daily living. We will work-up this abdominal pain and her complaints. Blood work and ultrasound imaging has been ordered. We will make further recommendations following review of these results.         All available hospital and outpatient records have been reviewed. Will continue other current drug therapies as ordered on the continuation of care documents. Physical and Occupational Therapy will assess and treat per POC. Analgesia for identified pain symptoms. Continue the various medicines for bowel and bladder symptoms as well as the vitamins and supplements per hospital instructions. Will assess and provide local care to abnormalities of skin integrity. Drug to drug interaction data as noted by the pharmacy has been reviewed. The patient's condition warrants the continuation of these drugs as prescribed by the medical specialists. Discharge planning will be coordinated through the  department. I have reviewed any advanced directive documentation that is contained in the admission packet as directives indicating whether a surrogate decision maker has been identified.               History of Present Illness       Progress note  DNR [facility]        This is a  long-term resident.      chronic abd pain     Patient suffers from schizoaffective disorder, major depression, bipolar disorder, anxiety disorder, paranoia with psychosis. She has had ECT in the past.        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, generalized weakness, impaired mobility, thrombocytopenia, vitamin D deficiency, hypothyroidism     MEDICATION  Facility protocol meds as needed, vitamin B12 1000 mcg daily, nystatin ointment, omeprazole 20 mg daily, vitamin D3 1000 units daily, clonazepam 0.5 mg 3 tablets twice daily, Synthroid 50 mcg daily, Zofran 4 mg every 6 hours as needed, trazodone 100 mg at bedtime, Sinemet  mg 3 times daily, Biotene dry mouth moisturizer, Lipitor 20 mg daily, folic acid 1 mg daily, Effexor  mg daily, valproic acid 250 mg twice daily, ax, artificial tears, Pyridium 200 mg every 12 hours as needed, Estrace 0.1 mg/g insert 0.1 g vaginally at bedtime for 14 days, Abilify 15 mg daily, Celexa 10 mg daily     SOCIAL/FAMILY HISTORY  Reviewed as previously documented      Review of Systems  All system review as allowed by patient condition and nursing input is negative        Active Problems  Problems    · Dementia with parkinsonism (331.82,294.10) (G20,F02.80)   · Depression with anxiety (300.4) (F41.8)   · Family history of breast cancer (V16.3) (Z80.3)   · Fibrocystic breast (610.1) (N60.19)   · Frontal lobe deficit (799.55) (R41.844)   · Memory loss (780.93) (R41.3)   · Neurocognitive deficits (781.99) (R29.818,R41.89)   · Parkinsonism (332.0) (G20)   · Progressive supranuclear palsy (333.0) (G23.1)   · Schizoaffective disorder, mixed type (295.70) (F25.0)     Past Medical  History  Problems    · History of anxiety (V11.8) (Z86.59)   · History of hypothyroidism (V12.29) (Z86.39)   · History of schizoaffective disorder (V11.0) (Z86.59)   · History of vitamin D deficiency (V12.1) (Z86.39)   · History of Polyp of vagina (623.7) (N84.2)   · History of Recurrent major depressive disorder, remission status unspecified (296.30)  (F33.9)   · History of Schizoaffective disorder, bipolar type without good prognostic features (295.70)  (F25.0)     Family History  Mother    · Family history of depression (V17.0) (Z81.8)  Father    · Family history of depression (V17.0) (Z81.8)  Sister    · Family history of breast cancer (V16.3) (Z80.3)  Brother    · Family history of mental disorder (V17.0) (Z81.8)  Maternal Aunt    · Family history of breast cancer (V16.3) (Z80.3)     Social History  Problems    · Born in [place]   · Completed college, bachelors degree   · General Sciences.   · Completed elementary school   · Completed graduate school, masters degree   · Criminal Justice Degree   · Completed high school   · Former smoker (V15.82) (Z87.891)   · Has no children   · Retired from employment   · Was a [employer]  from 1992 to 2009. Before that was [employer]      .   ·  (V61.07) (Z63.4)   ·  from 1989 to 2017 when her  passed away.     Allergies  Medication    · Penicillins   Recorded By: eJssica Felix; 1/16/2014 11:48:33 AM   · Tetracyclines   Recorded By: Jessica Felix; 1/16/2014 11:48:33 AM           Physical Exam     Vital signs as per nursing/MA documentation  General appearance: Alert and in no acute distress  HEENT: Normal Inspection  Neck - Normal Inspectiopn  Respiratory : No respiratory distress. Lungs are clear   Cardiovascular: heart rate normal. No gallop  Back - normal inspection  Skin inspection:Warm  Musculoskeletal : No deformities  Neuro : Limited exam. Baseline  Psychiatric : Cooperative

## 2025-08-19 ENCOUNTER — NURSING HOME VISIT (OUTPATIENT)
Dept: POST ACUTE CARE | Facility: EXTERNAL LOCATION | Age: 70
End: 2025-08-19
Payer: MEDICARE

## 2025-08-19 DIAGNOSIS — E03.9 HYPOTHYROIDISM, UNSPECIFIED TYPE: ICD-10-CM

## 2025-08-19 DIAGNOSIS — F31.9 BIPOLAR AFFECTIVE DISORDER, REMISSION STATUS UNSPECIFIED (MULTI): Primary | ICD-10-CM

## 2025-08-19 DIAGNOSIS — F25.9 SCHIZOAFFECTIVE DISORDER, UNSPECIFIED TYPE: ICD-10-CM

## 2025-08-19 DIAGNOSIS — E46 PROTEIN-CALORIE MALNUTRITION, UNSPECIFIED SEVERITY (MULTI): ICD-10-CM
